# Patient Record
Sex: MALE | Race: WHITE | Employment: OTHER | URBAN - METROPOLITAN AREA
[De-identification: names, ages, dates, MRNs, and addresses within clinical notes are randomized per-mention and may not be internally consistent; named-entity substitution may affect disease eponyms.]

---

## 2017-07-05 ENCOUNTER — TELEPHONE (OUTPATIENT)
Dept: PAIN MANAGEMENT | Age: 47
End: 2017-07-05

## 2017-07-18 ENCOUNTER — TELEPHONE (OUTPATIENT)
Dept: PAIN MANAGEMENT | Age: 47
End: 2017-07-18

## 2021-04-11 ENCOUNTER — HOSPITAL ENCOUNTER (OUTPATIENT)
Age: 51
Setting detail: OBSERVATION
Discharge: HOME | End: 2021-04-12
Attending: EMERGENCY MEDICINE | Admitting: EMERGENCY MEDICINE
Payer: COMMERCIAL

## 2021-04-11 ENCOUNTER — APPOINTMENT (OUTPATIENT)
Dept: GENERAL RADIOLOGY | Age: 51
End: 2021-04-11
Payer: COMMERCIAL

## 2021-04-11 DIAGNOSIS — R00.1 CHRONIC SINUS BRADYCARDIA: ICD-10-CM

## 2021-04-11 DIAGNOSIS — R07.89 OTHER CHEST PAIN: Primary | ICD-10-CM

## 2021-04-11 DIAGNOSIS — R00.1 BRADYCARDIA: ICD-10-CM

## 2021-04-11 LAB
-: NORMAL
ABSOLUTE EOS #: 0.21 K/UL (ref 0–0.44)
ABSOLUTE IMMATURE GRANULOCYTE: 0.05 K/UL (ref 0–0.3)
ABSOLUTE LYMPH #: 3.09 K/UL (ref 1.1–3.7)
ABSOLUTE MONO #: 0.67 K/UL (ref 0.1–1.2)
ALBUMIN SERPL-MCNC: 4.3 G/DL (ref 3.5–5.2)
ALBUMIN/GLOBULIN RATIO: 1.3 (ref 1–2.5)
ALP BLD-CCNC: 93 U/L (ref 40–129)
ALT SERPL-CCNC: 53 U/L (ref 5–41)
AMORPHOUS: NORMAL
ANION GAP SERPL CALCULATED.3IONS-SCNC: 11 MMOL/L (ref 9–17)
AST SERPL-CCNC: 57 U/L
BACTERIA: NORMAL
BASOPHILS # BLD: 1 % (ref 0–2)
BASOPHILS ABSOLUTE: 0.08 K/UL (ref 0–0.2)
BILIRUB SERPL-MCNC: 0.22 MG/DL (ref 0.3–1.2)
BILIRUBIN URINE: NEGATIVE
BUN BLDV-MCNC: 8 MG/DL (ref 6–20)
BUN/CREAT BLD: ABNORMAL (ref 9–20)
CALCIUM SERPL-MCNC: 9.3 MG/DL (ref 8.6–10.4)
CASTS UA: NORMAL /LPF (ref 0–8)
CHLORIDE BLD-SCNC: 99 MMOL/L (ref 98–107)
CO2: 27 MMOL/L (ref 20–31)
COLOR: YELLOW
COMMENT UA: ABNORMAL
CREAT SERPL-MCNC: 1.08 MG/DL (ref 0.7–1.2)
CRYSTALS, UA: NORMAL /HPF
DIFFERENTIAL TYPE: ABNORMAL
EOSINOPHILS RELATIVE PERCENT: 2 % (ref 1–4)
EPITHELIAL CELLS UA: NORMAL /HPF (ref 0–5)
GFR AFRICAN AMERICAN: >60 ML/MIN
GFR NON-AFRICAN AMERICAN: >60 ML/MIN
GFR SERPL CREATININE-BSD FRML MDRD: ABNORMAL ML/MIN/{1.73_M2}
GFR SERPL CREATININE-BSD FRML MDRD: ABNORMAL ML/MIN/{1.73_M2}
GLUCOSE BLD-MCNC: 106 MG/DL (ref 70–99)
GLUCOSE URINE: NEGATIVE
HCT VFR BLD CALC: 38.3 % (ref 40.7–50.3)
HEMOGLOBIN: 12.7 G/DL (ref 13–17)
IMMATURE GRANULOCYTES: 1 %
KETONES, URINE: NEGATIVE
LEUKOCYTE ESTERASE, URINE: NEGATIVE
LIPASE: 13 U/L (ref 13–60)
LYMPHOCYTES # BLD: 29 % (ref 24–43)
MCH RBC QN AUTO: 30.3 PG (ref 25.2–33.5)
MCHC RBC AUTO-ENTMCNC: 33.2 G/DL (ref 28.4–34.8)
MCV RBC AUTO: 91.4 FL (ref 82.6–102.9)
MONOCYTES # BLD: 6 % (ref 3–12)
MUCUS: NORMAL
NITRITE, URINE: NEGATIVE
NRBC AUTOMATED: 0 PER 100 WBC
OTHER OBSERVATIONS UA: NORMAL
PDW BLD-RTO: 12.4 % (ref 11.8–14.4)
PH UA: 5 (ref 5–8)
PLATELET # BLD: 276 K/UL (ref 138–453)
PLATELET ESTIMATE: ABNORMAL
PMV BLD AUTO: 10.4 FL (ref 8.1–13.5)
POTASSIUM SERPL-SCNC: 3.6 MMOL/L (ref 3.7–5.3)
PROTEIN UA: NEGATIVE
RBC # BLD: 4.19 M/UL (ref 4.21–5.77)
RBC # BLD: ABNORMAL 10*6/UL
RBC UA: NORMAL /HPF (ref 0–4)
RENAL EPITHELIAL, UA: NORMAL /HPF
SARS-COV-2, RAPID: NOT DETECTED
SEG NEUTROPHILS: 61 % (ref 36–65)
SEGMENTED NEUTROPHILS ABSOLUTE COUNT: 6.64 K/UL (ref 1.5–8.1)
SODIUM BLD-SCNC: 137 MMOL/L (ref 135–144)
SPECIFIC GRAVITY UA: 1 (ref 1–1.03)
SPECIMEN DESCRIPTION: NORMAL
TOTAL PROTEIN: 7.7 G/DL (ref 6.4–8.3)
TRICHOMONAS: NORMAL
TROPONIN INTERP: NORMAL
TROPONIN T: NORMAL NG/ML
TROPONIN, HIGH SENSITIVITY: 11 NG/L (ref 0–22)
TURBIDITY: CLEAR
URINE HGB: ABNORMAL
UROBILINOGEN, URINE: NORMAL
WBC # BLD: 10.7 K/UL (ref 3.5–11.3)
WBC # BLD: ABNORMAL 10*3/UL
WBC UA: NORMAL /HPF (ref 0–5)
YEAST: NORMAL

## 2021-04-11 PROCEDURE — 80053 COMPREHEN METABOLIC PANEL: CPT

## 2021-04-11 PROCEDURE — 84100 ASSAY OF PHOSPHORUS: CPT

## 2021-04-11 PROCEDURE — 93005 ELECTROCARDIOGRAM TRACING: CPT | Performed by: EMERGENCY MEDICINE

## 2021-04-11 PROCEDURE — 81001 URINALYSIS AUTO W/SCOPE: CPT

## 2021-04-11 PROCEDURE — 85025 COMPLETE CBC W/AUTO DIFF WBC: CPT

## 2021-04-11 PROCEDURE — 84484 ASSAY OF TROPONIN QUANT: CPT

## 2021-04-11 PROCEDURE — G0378 HOSPITAL OBSERVATION PER HR: HCPCS

## 2021-04-11 PROCEDURE — 99285 EMERGENCY DEPT VISIT HI MDM: CPT

## 2021-04-11 PROCEDURE — 87635 SARS-COV-2 COVID-19 AMP PRB: CPT

## 2021-04-11 PROCEDURE — 83735 ASSAY OF MAGNESIUM: CPT

## 2021-04-11 PROCEDURE — 83690 ASSAY OF LIPASE: CPT

## 2021-04-11 PROCEDURE — 6370000000 HC RX 637 (ALT 250 FOR IP): Performed by: EMERGENCY MEDICINE

## 2021-04-11 PROCEDURE — 71046 X-RAY EXAM CHEST 2 VIEWS: CPT

## 2021-04-11 PROCEDURE — 36415 COLL VENOUS BLD VENIPUNCTURE: CPT

## 2021-04-11 RX ORDER — CLONIDINE HYDROCHLORIDE 0.2 MG/1
0.2 TABLET ORAL 2 TIMES DAILY
Status: ON HOLD | COMMUNITY
End: 2021-04-12 | Stop reason: HOSPADM

## 2021-04-11 RX ORDER — BENZONATATE 100 MG/1
100 CAPSULE ORAL 3 TIMES DAILY PRN
Status: ON HOLD | COMMUNITY
End: 2022-05-17

## 2021-04-11 RX ORDER — CLONIDINE 0.2 MG/24H
1 PATCH, EXTENDED RELEASE TRANSDERMAL WEEKLY
Status: ON HOLD | COMMUNITY
End: 2021-04-12 | Stop reason: HOSPADM

## 2021-04-11 RX ORDER — CLONIDINE HYDROCHLORIDE 0.1 MG/1
0.2 TABLET ORAL 2 TIMES DAILY
Status: DISCONTINUED | OUTPATIENT
Start: 2021-04-11 | End: 2021-04-12 | Stop reason: HOSPADM

## 2021-04-11 RX ORDER — METHADONE HYDROCHLORIDE 5 MG/5ML
135 SOLUTION ORAL DAILY
COMMUNITY
Start: 2021-04-12

## 2021-04-11 RX ORDER — SERTRALINE HYDROCHLORIDE 100 MG/1
200 TABLET, FILM COATED ORAL DAILY
Status: ON HOLD | COMMUNITY
End: 2021-04-12 | Stop reason: HOSPADM

## 2021-04-11 RX ORDER — ACETAMINOPHEN 325 MG/1
650 TABLET ORAL EVERY 4 HOURS PRN
Status: DISCONTINUED | OUTPATIENT
Start: 2021-04-11 | End: 2021-04-12 | Stop reason: HOSPADM

## 2021-04-11 RX ORDER — ATORVASTATIN CALCIUM 40 MG/1
40 TABLET, FILM COATED ORAL DAILY
Status: ON HOLD | COMMUNITY
End: 2021-04-12 | Stop reason: HOSPADM

## 2021-04-11 RX ORDER — SODIUM CHLORIDE 0.9 % (FLUSH) 0.9 %
5-40 SYRINGE (ML) INJECTION EVERY 12 HOURS SCHEDULED
Status: DISCONTINUED | OUTPATIENT
Start: 2021-04-11 | End: 2021-04-12 | Stop reason: HOSPADM

## 2021-04-11 RX ORDER — ACETAMINOPHEN 500 MG
1000 TABLET ORAL ONCE
Status: COMPLETED | OUTPATIENT
Start: 2021-04-11 | End: 2021-04-11

## 2021-04-11 RX ORDER — SODIUM CHLORIDE 0.9 % (FLUSH) 0.9 %
5-40 SYRINGE (ML) INJECTION PRN
Status: DISCONTINUED | OUTPATIENT
Start: 2021-04-11 | End: 2021-04-12 | Stop reason: HOSPADM

## 2021-04-11 RX ORDER — ATORVASTATIN CALCIUM 80 MG/1
40 TABLET, FILM COATED ORAL DAILY
Status: DISCONTINUED | OUTPATIENT
Start: 2021-04-12 | End: 2021-04-12 | Stop reason: HOSPADM

## 2021-04-11 RX ORDER — SODIUM CHLORIDE 9 MG/ML
25 INJECTION, SOLUTION INTRAVENOUS PRN
Status: DISCONTINUED | OUTPATIENT
Start: 2021-04-11 | End: 2021-04-12 | Stop reason: HOSPADM

## 2021-04-11 RX ORDER — BENZONATATE 100 MG/1
100 CAPSULE ORAL 3 TIMES DAILY PRN
Status: DISCONTINUED | OUTPATIENT
Start: 2021-04-11 | End: 2021-04-12 | Stop reason: HOSPADM

## 2021-04-11 RX ORDER — IBUPROFEN 800 MG/1
800 TABLET ORAL ONCE
Status: COMPLETED | OUTPATIENT
Start: 2021-04-11 | End: 2021-04-11

## 2021-04-11 RX ADMIN — ACETAMINOPHEN 1000 MG: 500 TABLET ORAL at 19:27

## 2021-04-11 RX ADMIN — IBUPROFEN 800 MG: 800 TABLET ORAL at 19:27

## 2021-04-11 RX ADMIN — ACETAMINOPHEN 650 MG: 325 TABLET ORAL at 23:58

## 2021-04-11 RX ADMIN — CLONIDINE HYDROCHLORIDE 0.2 MG: 0.1 TABLET ORAL at 22:44

## 2021-04-11 RX ADMIN — BENZONATATE 100 MG: 100 CAPSULE ORAL at 22:44

## 2021-04-11 ASSESSMENT — ENCOUNTER SYMPTOMS
NAUSEA: 0
SORE THROAT: 0
SHORTNESS OF BREATH: 1
VOMITING: 0
ABDOMINAL PAIN: 0
DIARRHEA: 0
CONSTIPATION: 0

## 2021-04-11 ASSESSMENT — PAIN DESCRIPTION - LOCATION: LOCATION: CHEST

## 2021-04-11 ASSESSMENT — PAIN SCALES - GENERAL: PAINLEVEL_OUTOF10: 8

## 2021-04-11 ASSESSMENT — PAIN DESCRIPTION - PAIN TYPE: TYPE: ACUTE PAIN

## 2021-04-11 NOTE — ED NOTES
Pt to ED cc left side CP and HTN ongiong for a couple of days that is associated with SOB. Pt states he does not have any cardiac hx. Pt states he noticed the pain is worse when he is moving around. Pt denies any N/V/D, Pt placed on continuous cardiac monitoring, BP, Pulse ox. EKG obtained. IV established and labs drawn. Pt alert and oriented x4, talking in complete sentences, respirations even and unlabored. Pt acting age appropriate.  White board updated, will continue to plan of care         Shefali Hodge RN  04/11/21 1951

## 2021-04-12 ENCOUNTER — APPOINTMENT (OUTPATIENT)
Dept: NUCLEAR MEDICINE | Age: 51
End: 2021-04-12
Payer: COMMERCIAL

## 2021-04-12 VITALS
HEART RATE: 52 BPM | DIASTOLIC BLOOD PRESSURE: 81 MMHG | SYSTOLIC BLOOD PRESSURE: 177 MMHG | OXYGEN SATURATION: 93 % | TEMPERATURE: 98.2 F | RESPIRATION RATE: 15 BRPM

## 2021-04-12 LAB
ABSOLUTE EOS #: 0.19 K/UL (ref 0–0.44)
ABSOLUTE IMMATURE GRANULOCYTE: 0.03 K/UL (ref 0–0.3)
ABSOLUTE LYMPH #: 2.95 K/UL (ref 1.1–3.7)
ABSOLUTE MONO #: 0.52 K/UL (ref 0.1–1.2)
ANION GAP SERPL CALCULATED.3IONS-SCNC: 11 MMOL/L (ref 9–17)
BASOPHILS # BLD: 1 % (ref 0–2)
BASOPHILS ABSOLUTE: 0.04 K/UL (ref 0–0.2)
BUN BLDV-MCNC: 10 MG/DL (ref 6–20)
BUN/CREAT BLD: ABNORMAL (ref 9–20)
CALCIUM SERPL-MCNC: 8.9 MG/DL (ref 8.6–10.4)
CHLORIDE BLD-SCNC: 102 MMOL/L (ref 98–107)
CO2: 27 MMOL/L (ref 20–31)
CREAT SERPL-MCNC: 1 MG/DL (ref 0.7–1.2)
DIFFERENTIAL TYPE: ABNORMAL
EOSINOPHILS RELATIVE PERCENT: 2 % (ref 1–4)
GFR AFRICAN AMERICAN: >60 ML/MIN
GFR NON-AFRICAN AMERICAN: >60 ML/MIN
GFR SERPL CREATININE-BSD FRML MDRD: ABNORMAL ML/MIN/{1.73_M2}
GFR SERPL CREATININE-BSD FRML MDRD: ABNORMAL ML/MIN/{1.73_M2}
GLUCOSE BLD-MCNC: 114 MG/DL (ref 70–99)
HCT VFR BLD CALC: 37.1 % (ref 40.7–50.3)
HEMOGLOBIN: 12.3 G/DL (ref 13–17)
IMMATURE GRANULOCYTES: 0 %
LV EF: 47 %
LV EF: 55 %
LVEF MODALITY: NORMAL
LVEF MODALITY: NORMAL
LYMPHOCYTES # BLD: 38 % (ref 24–43)
MAGNESIUM: 1.8 MG/DL (ref 1.6–2.6)
MCH RBC QN AUTO: 30.5 PG (ref 25.2–33.5)
MCHC RBC AUTO-ENTMCNC: 33.2 G/DL (ref 28.4–34.8)
MCV RBC AUTO: 92.1 FL (ref 82.6–102.9)
MONOCYTES # BLD: 7 % (ref 3–12)
NRBC AUTOMATED: 0 PER 100 WBC
PDW BLD-RTO: 12.4 % (ref 11.8–14.4)
PHOSPHORUS: 3.1 MG/DL (ref 2.5–4.5)
PLATELET # BLD: 244 K/UL (ref 138–453)
PLATELET ESTIMATE: ABNORMAL
PMV BLD AUTO: 10.3 FL (ref 8.1–13.5)
POTASSIUM SERPL-SCNC: 4 MMOL/L (ref 3.7–5.3)
RBC # BLD: 4.03 M/UL (ref 4.21–5.77)
RBC # BLD: ABNORMAL 10*6/UL
SEG NEUTROPHILS: 52 % (ref 36–65)
SEGMENTED NEUTROPHILS ABSOLUTE COUNT: 4.11 K/UL (ref 1.5–8.1)
SODIUM BLD-SCNC: 140 MMOL/L (ref 135–144)
TROPONIN INTERP: NORMAL
TROPONIN T: NORMAL NG/ML
TROPONIN, HIGH SENSITIVITY: 8 NG/L (ref 0–22)
TSH SERPL DL<=0.05 MIU/L-ACNC: 3.6 MIU/L (ref 0.3–5)
WBC # BLD: 7.8 K/UL (ref 3.5–11.3)
WBC # BLD: ABNORMAL 10*3/UL

## 2021-04-12 PROCEDURE — 93017 CV STRESS TEST TRACING ONLY: CPT

## 2021-04-12 PROCEDURE — 93005 ELECTROCARDIOGRAM TRACING: CPT | Performed by: EMERGENCY MEDICINE

## 2021-04-12 PROCEDURE — 85025 COMPLETE CBC W/AUTO DIFF WBC: CPT

## 2021-04-12 PROCEDURE — G0378 HOSPITAL OBSERVATION PER HR: HCPCS

## 2021-04-12 PROCEDURE — 93306 TTE W/DOPPLER COMPLETE: CPT

## 2021-04-12 PROCEDURE — 78452 HT MUSCLE IMAGE SPECT MULT: CPT

## 2021-04-12 PROCEDURE — 84484 ASSAY OF TROPONIN QUANT: CPT

## 2021-04-12 PROCEDURE — 3430000000 HC RX DIAGNOSTIC RADIOPHARMACEUTICAL: Performed by: INTERNAL MEDICINE

## 2021-04-12 PROCEDURE — 6360000002 HC RX W HCPCS: Performed by: STUDENT IN AN ORGANIZED HEALTH CARE EDUCATION/TRAINING PROGRAM

## 2021-04-12 PROCEDURE — 2580000003 HC RX 258: Performed by: INTERNAL MEDICINE

## 2021-04-12 PROCEDURE — 96374 THER/PROPH/DIAG INJ IV PUSH: CPT

## 2021-04-12 PROCEDURE — A9500 TC99M SESTAMIBI: HCPCS | Performed by: INTERNAL MEDICINE

## 2021-04-12 PROCEDURE — 6360000002 HC RX W HCPCS: Performed by: INTERNAL MEDICINE

## 2021-04-12 PROCEDURE — 6370000000 HC RX 637 (ALT 250 FOR IP): Performed by: STUDENT IN AN ORGANIZED HEALTH CARE EDUCATION/TRAINING PROGRAM

## 2021-04-12 PROCEDURE — 84443 ASSAY THYROID STIM HORMONE: CPT

## 2021-04-12 PROCEDURE — 2580000003 HC RX 258: Performed by: STUDENT IN AN ORGANIZED HEALTH CARE EDUCATION/TRAINING PROGRAM

## 2021-04-12 PROCEDURE — 80048 BASIC METABOLIC PNL TOTAL CA: CPT

## 2021-04-12 RX ORDER — SODIUM CHLORIDE 0.9 % (FLUSH) 0.9 %
10 SYRINGE (ML) INJECTION PRN
Status: DISCONTINUED | OUTPATIENT
Start: 2021-04-12 | End: 2021-04-12 | Stop reason: HOSPADM

## 2021-04-12 RX ORDER — AMINOPHYLLINE DIHYDRATE 25 MG/ML
50 INJECTION, SOLUTION INTRAVENOUS PRN
Status: DISCONTINUED | OUTPATIENT
Start: 2021-04-12 | End: 2021-04-12 | Stop reason: ALTCHOICE

## 2021-04-12 RX ORDER — METHADONE HYDROCHLORIDE 5 MG/5ML
135 SOLUTION ORAL DAILY
Status: DISCONTINUED | OUTPATIENT
Start: 2021-04-12 | End: 2021-04-12 | Stop reason: HOSPADM

## 2021-04-12 RX ORDER — METOPROLOL TARTRATE 5 MG/5ML
5 INJECTION INTRAVENOUS EVERY 5 MIN PRN
Status: DISCONTINUED | OUTPATIENT
Start: 2021-04-12 | End: 2021-04-12 | Stop reason: ALTCHOICE

## 2021-04-12 RX ORDER — SODIUM CHLORIDE 0.9 % (FLUSH) 0.9 %
5-40 SYRINGE (ML) INJECTION PRN
Status: DISCONTINUED | OUTPATIENT
Start: 2021-04-12 | End: 2021-04-12 | Stop reason: ALTCHOICE

## 2021-04-12 RX ORDER — NITROGLYCERIN 0.4 MG/1
0.4 TABLET SUBLINGUAL EVERY 5 MIN PRN
Status: DISCONTINUED | OUTPATIENT
Start: 2021-04-12 | End: 2021-04-12 | Stop reason: ALTCHOICE

## 2021-04-12 RX ORDER — ATROPINE SULFATE 0.1 MG/ML
0.5 INJECTION INTRAVENOUS EVERY 5 MIN PRN
Status: DISCONTINUED | OUTPATIENT
Start: 2021-04-12 | End: 2021-04-12 | Stop reason: ALTCHOICE

## 2021-04-12 RX ORDER — ALPRAZOLAM 0.25 MG/1
0.25 TABLET ORAL ONCE
Status: COMPLETED | OUTPATIENT
Start: 2021-04-12 | End: 2021-04-12

## 2021-04-12 RX ORDER — KETOROLAC TROMETHAMINE 15 MG/ML
15 INJECTION, SOLUTION INTRAMUSCULAR; INTRAVENOUS ONCE
Status: COMPLETED | OUTPATIENT
Start: 2021-04-12 | End: 2021-04-12

## 2021-04-12 RX ORDER — ALPRAZOLAM 0.25 MG/1
0.5 TABLET ORAL 3 TIMES DAILY PRN
Qty: 30 TABLET | Refills: 0 | Status: SHIPPED | OUTPATIENT
Start: 2021-04-12 | End: 2021-05-12

## 2021-04-12 RX ORDER — AMLODIPINE BESYLATE 5 MG/1
5 TABLET ORAL DAILY
Status: DISCONTINUED | OUTPATIENT
Start: 2021-04-12 | End: 2021-04-12 | Stop reason: HOSPADM

## 2021-04-12 RX ORDER — SODIUM CHLORIDE 9 MG/ML
INJECTION, SOLUTION INTRAVENOUS CONTINUOUS
Status: DISCONTINUED | OUTPATIENT
Start: 2021-04-12 | End: 2021-04-12 | Stop reason: HOSPADM

## 2021-04-12 RX ORDER — SODIUM CHLORIDE 9 MG/ML
500 INJECTION, SOLUTION INTRAVENOUS CONTINUOUS PRN
Status: DISCONTINUED | OUTPATIENT
Start: 2021-04-12 | End: 2021-04-12 | Stop reason: ALTCHOICE

## 2021-04-12 RX ADMIN — SODIUM CHLORIDE, PRESERVATIVE FREE 10 ML: 5 INJECTION INTRAVENOUS at 11:50

## 2021-04-12 RX ADMIN — KETOROLAC TROMETHAMINE 15 MG: 15 INJECTION, SOLUTION INTRAMUSCULAR; INTRAVENOUS at 08:59

## 2021-04-12 RX ADMIN — REGADENOSON 0.4 MG: 0.08 INJECTION, SOLUTION INTRAVENOUS at 11:50

## 2021-04-12 RX ADMIN — TETRAKIS(2-METHOXYISOBUTYLISOCYANIDE)COPPER(I) TETRAFLUOROBORATE 33 MILLICURIE: 1 INJECTION, POWDER, LYOPHILIZED, FOR SOLUTION INTRAVENOUS at 11:55

## 2021-04-12 RX ADMIN — TETRAKIS(2-METHOXYISOBUTYLISOCYANIDE)COPPER(I) TETRAFLUOROBORATE 12.6 MILLICURIE: 1 INJECTION, POWDER, LYOPHILIZED, FOR SOLUTION INTRAVENOUS at 10:08

## 2021-04-12 RX ADMIN — SODIUM CHLORIDE, PRESERVATIVE FREE 10 ML: 5 INJECTION INTRAVENOUS at 11:55

## 2021-04-12 RX ADMIN — ALPRAZOLAM 0.25 MG: 0.25 TABLET ORAL at 15:51

## 2021-04-12 RX ADMIN — SODIUM CHLORIDE, PRESERVATIVE FREE 10 ML: 5 INJECTION INTRAVENOUS at 11:49

## 2021-04-12 RX ADMIN — SODIUM CHLORIDE, PRESERVATIVE FREE 10 ML: 5 INJECTION INTRAVENOUS at 10:08

## 2021-04-12 RX ADMIN — SODIUM CHLORIDE: 9 INJECTION, SOLUTION INTRAVENOUS at 06:15

## 2021-04-12 RX ADMIN — METHADONE HYDROCHLORIDE 135 MG: 5 SOLUTION ORAL at 09:43

## 2021-04-12 ASSESSMENT — PAIN SCALES - GENERAL: PAINLEVEL_OUTOF10: 0

## 2021-04-12 NOTE — CONSULTS
Attestation signed by      Attending Physician Statement:    I have discussed the care of  Darrell Peck , including pertinent history and exam findings, with the Cardiology fellow/resident. I have seen and examined the patient and the key elements of all parts of the encounter have been performed by me. I agree with the assessment, plan and orders as documented by the fellow/resident, after I modified exam findings and plan of treatments, and the final version is my approved version of the assessment. Additional Comments: The patient was seen and examined, agree with below. Chest pain. Sinus bradycardia. Resolved and HR is 80-90. Wean off Clonidine. Plan for echo and stress test.              [facility]    Consult / H&P               Today's Date: 4/12/2021  Patient Name: Darrell Peck  Date of admission: 4/11/2021  6:48 PM  Patient's age: 45 y.o., 1970  Admission Dx: Bradycardia [R00.1]    Reason for Consult:  Cardiac evaluation    Requesting Physician: Yumiko Rivera MD    CHIEF COMPLAINT:  Chest pain and SOB     History Obtained From:  patient    HISTORY OF PRESENT ILLNESS:      58-year-old male with past medical history of bipolar disorder, hypertension presented with complaint of chest pain and shortness of breath. Has been having chest pain from last 2 days. Right side of chest without any radiation. EKG showed normal sinus rhythm. On presentation he is hypertensive with blood pressure in the 90s. He became bradycardic to 30s. Asymptomatic. Had stress test done in 2013 and it was normal.  Not on any AV ana blocking agents. TSH pending. Past Medical History:   has a past medical history of Bipolar 1 disorder ([address] and Herniated cervical disc. Past Surgical History:   has a past surgical history that includes Appendectomy and fracture surgery. Home Medications:    Prior to Admission medications    Medication Sig Start Date End Date Taking?  Authorizing Provider cloNIDine (CATAPRES) 0.2 MG/24HR PTWK Place 1 patch onto the skin once a week   Yes Historical Provider, MD   cloNIDine (CATAPRES) 0.2 MG tablet Take 0.2 mg by mouth 2 times daily   Yes Historical Provider, MD   atorvastatin (LIPITOR) 40 MG tablet Take 40 mg by mouth daily   Yes Historical Provider, MD   benzonatate (TESSALON) 100 MG capsule Take 100 mg by mouth 3 times daily as needed for Cough   Yes Historical Provider, MD   sertraline (ZOLOFT) 100 MG tablet Take 200 mg by mouth daily   Yes Historical Provider, MD   guanFACINE HCl (TENEX PO) Take 1 mg by mouth   Yes Historical Provider, MD   Ibuprofen (MOTRIN PO) Take by mouth   Yes Historical Provider, MD   methadone 5 MG/5ML solution Take 135 mg by mouth daily. Takes at 0700 am 4/12/21  Yes Historical Provider, MD   acetaminophen (TYLENOL) 325 MG tablet Take by mouth every 6 hours as needed for Pain    Historical Provider, MD   divalproex (DEPAKOTE) 500 MG DR tablet Take 500 mg by mouth 3 times daily     Historical Provider, MD      Current Facility-Administered Medications: 0.9 % sodium chloride infusion, , Intravenous, Continuous  sodium chloride flush 0.9 % injection 5-40 mL, 5-40 mL, Intravenous, 2 times per day  sodium chloride flush 0.9 % injection 5-40 mL, 5-40 mL, Intravenous, PRN  0.9 % sodium chloride infusion, 25 mL, Intravenous, PRN  enoxaparin (LOVENOX) injection 40 mg, 40 mg, Subcutaneous, Daily  acetaminophen (TYLENOL) tablet 650 mg, 650 mg, Oral, Q4H PRN  cloNIDine (CATAPRES) tablet 0.2 mg, 0.2 mg, Oral, BID  atorvastatin (LIPITOR) tablet 40 mg, 40 mg, Oral, Daily  benzonatate (TESSALON) capsule 100 mg, 100 mg, Oral, TID PRN  sertraline (ZOLOFT) tablet 200 mg, 200 mg, Oral, Daily    Allergies:  Codeine, Pcn [penicillins], and Sulfa antibiotics    Social History:   reports that he has been smoking cigarettes. He has been smoking about 0.50 packs per day. He has never used smokeless tobacco. He reports that he does not drink alcohol or use drugs. Family History: family history is not on file. No h/o sudden cardiac death. No for premature CAD    REVIEW OF SYSTEMS:    · Constitutional: there has been no unanticipated weight loss. There's been No change in energy level, No change in activity level. · Eyes: No visual changes or diplopia. No scleral icterus. · ENT: No Headaches  · Cardiovascular: As mentioned in H&P   · Respiratory: No previous pulmonary problems, No cough  · Gastrointestinal: No abdominal pain. No change in bowel or bladder habits. · Genitourinary: No dysuria, trouble voiding, or hematuria. · Musculoskeletal:  No gait disturbance, No weakness or joint complaints. · Integumentary: No rash or pruritis. · Neurological: No headache, diplopia, change in muscle strength, numbness or tingling. No change in gait, balance, coordination, mood, affect, memory, mentation, behavior. · s. PHYSICAL EXAM:      BP (!) 152/91   Pulse 51   Temp 98.6 °F (37 °C) (Oral)   Resp 15   SpO2 96%    Constitutional and General Appearance: alert, cooperative, no distress and appears stated age  HEENT: PERRL, no cervical lymphadenopathy. No masses palpable. Normal oral mucosa  Respiratory:  · Normal excursion and expansion without use of accessory muscles  · Resp Auscultation: Good respiratory effort. No for increased work of breathing. On auscultation: clear to auscultation bilaterally  Cardiovascular:  · The apical impulse is not displaced  · Heart tones are crisp and normal. regular S1 and S2.  · Jugular venous pulsation Normal  · The carotid upstroke is normal in amplitude and contour without delay or bruit  · Peripheral pulses are symmetrical and full   Abdomen:   · No masses or tenderness  · Bowel sounds present  Extremities:  ·  No Cyanosis or Clubbing  ·  Lower extremity edema: No  ·  Skin: Warm and dry  Neurological:  · Alert and oriented.   · Moves all extremities well  · No abnormalities of mood, affect, memory, mentation, or behavior are noted    DATA:    }. Labs:     CBC:   Recent Labs     04/11/21 1923 04/12/21  0600   WBC 10.7 7.8   HGB 12.7* 12.3*   HCT 38.3* 37.1*    244     BMP:   Recent Labs     04/11/21 1917      K 3.6*   CO2 27   BUN 8   CREATININE 1.08   LABGLOM >60   GLUCOSE 106*     BNP: No results for input(s): BNP in the last 72 hours. PT/INR: No results for input(s): PROTIME, INR in the last 72 hours. APTT:No results for input(s): APTT in the last 72 hours. CARDIAC ENZYMES:No results for input(s): CKTOTAL, CKMB, CKMBINDEX, TROPONINI in the last 72 hours. FASTING LIPID PANEL:No results found for: HDL, LDLDIRECT, LDLCALC, TRIG  LIVER PROFILE:  Recent Labs     04/11/21 1917   AST 57*   ALT 53*   LABALBU 4.3       IMPRESSION:    Patient Active Problem List   Diagnosis    Bradycardia     Assessment:         1  Chest pain  2. Dyspnea on exertion  3. Sinus bradycardia  4. Hypertensive urgency  5. Smoker   6. Last stress test ion 2013 - Negative       RECOMMENDATIONS:  1. EKG showed sinus bradycardia. No ST or T changes   2. TSH pending. Will order 2D echo to rule out any structural and functional abnormalities of the heart. 3. Trend troponin for 2 occurrences. 4. Avoid AV ana blocking agent. Will change clonidine to Norvasc   5.  will do Lexiscan stress test      Discussed with patient and Nurse.     Electronically signed by Kanchan Tran MD on 4/12/2021 at 6:37 [address] [place] [address] [facility]

## 2021-04-12 NOTE — CARE COORDINATION
Case Management Initial Discharge Plan  Elie Madrigal,             Met with:patient to discuss discharge plans. Information verified: address, contacts, phone number, , insurance Yes    Emergency Contact/Next of Kin name & number: Jessica Marin, spouse [phone]    PCP: Kala Baxter, RENETTA - CNP  Date of last visit: Patient sees Dr Perrin Hammans and was last seen 6 months ago     Insurance Provider: [facility]     Discharge Planning    Living Arrangements:      Support Systems:  Spouse/Significant Other    Home has 1 stories  0 stairs to climb to get into front door, na stairs to climb to reach second floor  Location of bedroom/bathroom in home main level     Patient able to perform ADL's:Independent    Current Services (outpatient & in home) none   DME equipment: none   DME provider: na     Receiving oral anticoagulation therapy? No    If indicated:   Physician managing anticoagulation treatment: na  Where does patient obtain lab work for ATC treatment? na      Potential Assistance Needed:  N/A    Patient agreeable to home care: No  [place] of choice provided:  n/a    Prior SNF/Rehab Placement and Facility: none   Agreeable to SNF/Rehab: No  [place] of choice provided: n/a     Evaluation: no    Expected Discharge date:       Patient expects to be discharged to:  Home  Follow Up Appointment: Best Day/ Time:      Transportation provider: family   Transportation arrangements needed for discharge: No    Readmission Risk              Risk of Unplanned Readmission:        0             Does patient have a readmission risk score greater than 14?: No  If yes, follow-up appointment must be made within 7 days of discharge. Goals of Care:  To feel like I'm secure       Discharge Plan: Home independently           Electronically signed by Abrahan Schultz RN on 21 at 2:22 PM EDT

## 2021-04-12 NOTE — ED PROVIDER NOTES
[facility] ED  Emergency Department Encounter  EmergencyMedicine Resident     Pt Name:Jose Barfield  MRN: [id number]  [place] 1970  Date of evaluation: 4/11/21  PCP:  RENETTA Andujar CNP    CHIEF COMPLAINT       Chief Complaint   Patient presents with    Chest Pain    Hypertension       HISTORY OF PRESENT ILLNESS  (Location/Symptom, Timing/Onset, Context/Setting, Quality, Duration, Modifying Factors, Severity.)      Bang Chan is a 45 y.o. male who presents to the emergency department with a 2-day history of chest pain and shortness of breath with a 2-week history of significant hypertension. Patient took his blood pressure at home and it was about 269 systolic so he came into the ED for evaluation. Chest pain is worsened with motion and anxiety. Patient endorses shortness of breath and denies recent fever, chills, nausea, vomiting, problems with urination or bowel movements, numbness or tingling anywhere, or abdominal pain. PAST MEDICAL / SURGICAL / SOCIAL / FAMILY HISTORY      has a past medical history of Bipolar 1 disorder ([address] and Herniated cervical disc.     has a past surgical history that includes Appendectomy and fracture surgery.     Social History     Socioeconomic History    Marital status:      Spouse name: Not on file    Number of children: Not on file    Years of education: Not on file    Highest education level: Not on file   Occupational History    Not on file   Social Needs    Financial resource strain: Not on file    Food insecurity     Worry: Not on file     Inability: Not on file    Transportation needs     Medical: Not on file     Non-medical: Not on file   Tobacco Use    Smoking status: Current Every Day Smoker     Packs/day: 0.50     Types: Cigarettes    Smokeless tobacco: Never Used   Substance and Sexual Activity    Alcohol use: No    Drug use: No    Sexual activity: Not on file   Lifestyle    Physical activity     Days per week: Not on file     Minutes per session: Not on file    Stress: Not on file   Relationships    Social connections     Talks on phone: Not on file     Gets together: Not on file     Attends Jew service: Not on file     Active member of club or organization: Not on file     Attends meetings of clubs or organizations: Not on file     Relationship status: Not on file    Intimate partner violence     Fear of current or ex partner: Not on file     Emotionally abused: Not on file     Physically abused: Not on file     Forced sexual activity: Not on file   Other Topics Concern    Not on file   Social History Narrative    Not on file       History reviewed. No pertinent family history. Allergies:  Codeine, Pcn [penicillins], and Sulfa antibiotics    Home Medications:  Prior to Admission medications    Medication Sig Start Date End Date Taking? Authorizing Provider   cloNIDine (CATAPRES) 0.2 MG/24HR PTWK Place 1 patch onto the skin once a week   Yes Historical Provider, MD   cloNIDine (CATAPRES) 0.2 MG tablet Take 0.2 mg by mouth 2 times daily   Yes Historical Provider, MD   atorvastatin (LIPITOR) 40 MG tablet Take 40 mg by mouth daily   Yes Historical Provider, MD   benzonatate (TESSALON) 100 MG capsule Take 100 mg by mouth 3 times daily as needed for Cough   Yes Historical Provider, MD   sertraline (ZOLOFT) 100 MG tablet Take 200 mg by mouth daily   Yes Historical Provider, MD   guanFACINE HCl (TENEX PO) Take 1 mg by mouth   Yes Historical Provider, MD   Ibuprofen (MOTRIN PO) Take by mouth   Yes Historical Provider, MD   acetaminophen (TYLENOL) 325 MG tablet Take by mouth every 6 hours as needed for Pain    Historical Provider, MD   divalproex (DEPAKOTE) 500 MG DR tablet Take 500 mg by mouth 3 times daily     Historical Provider, MD       REVIEW OF SYSTEMS    (2-9 systems for level 4, 10 or more for level 5)      Review of Systems   Constitutional: Negative for chills and fever.    HENT: Negative for ear pain, hearing loss and sore throat. Eyes: Negative for visual disturbance. Respiratory: Positive for shortness of breath. Cardiovascular: Positive for chest pain. Gastrointestinal: Negative for abdominal pain, constipation, diarrhea, nausea and vomiting. Genitourinary: Negative for difficulty urinating and dysuria. Musculoskeletal: Negative for arthralgias and myalgias. Neurological: Negative for weakness and numbness. Psychiatric/Behavioral: Negative for agitation and confusion. The patient is nervous/anxious. PHYSICAL EXAM   (up to 7 for level 4, 8 or more for level 5)      INITIAL VITALS:   /82   Pulse (!) 40   Temp 98.6 °F (37 °C) (Oral)   Resp 13   SpO2 95%     Physical Exam  Vitals signs and nursing note reviewed. Constitutional:       General: He is not in acute distress. Appearance: Normal appearance. He is well-developed. He is not ill-appearing or diaphoretic. HENT:      Head: Normocephalic and atraumatic. Right Ear: External ear normal.      Left Ear: External ear normal.      Nose: Nose normal.      Mouth/Throat:      Mouth: Mucous membranes are moist.   Eyes:      Extraocular Movements: Extraocular movements intact. Conjunctiva/sclera: Conjunctivae normal.   Neck:      Musculoskeletal: Normal range of motion and neck supple. Trachea: No tracheal deviation. Cardiovascular:      Rate and Rhythm: Regular rhythm. Bradycardia present. Heart sounds: Normal heart sounds. No murmur. No friction rub. No gallop. Pulmonary:      Effort: Pulmonary effort is normal. No respiratory distress. Breath sounds: Normal breath sounds. No wheezing, rhonchi or rales. Abdominal:      General: Abdomen is flat. There is no distension. Musculoskeletal: Normal range of motion. General: No swelling, deformity or signs of injury. Right lower leg: No edema. Left lower leg: No edema. Skin:     General: Skin is warm and dry.       Capillary Refill: Capillary refill takes less than 2 seconds. Coloration: Skin is not jaundiced. Findings: No bruising or lesion. Neurological:      General: No focal deficit present. Mental Status: He is alert and oriented to person, place, and time. Mental status is at baseline. Motor: No abnormal muscle tone. Psychiatric:      Comments: Patient has pressured speech         DIFFERENTIAL  DIAGNOSIS     PLAN (LABS / IMAGING / EKG):  Orders Placed This Encounter   Procedures    COVID-19, Rapid    XR CHEST (2 VW)    CBC Auto Differential    Comprehensive Metabolic Panel w/ Reflex to MG    Lipase    Troponin    Urinalysis Reflex to Culture    Microscopic Urinalysis    PATIENT STATUS (FROM ED OR OR/PROCEDURAL) Observation       MEDICATIONS ORDERED:  Orders Placed This Encounter   Medications    acetaminophen (TYLENOL) tablet 1,000 mg    ibuprofen (ADVIL;MOTRIN) tablet 800 mg       DDX: Zander Vazquez is a 45 y.o. male who presents to the emergency department with chest pain, hypertension, anxiety.  Differential diagnosis includes ACS, symptomatic bradycardia, cardiac arrhythmia, electrolyte derangement, anxiety    DIAGNOSTIC RESULTS / EMERGENCY DEPARTMENT COURSE / MDM   LAB RESULTS:  Results for orders placed or performed during the hospital encounter of 04/11/21   CBC Auto Differential   Result Value Ref Range    WBC 10.7 3.5 - 11.3 k/uL    RBC 4.19 (L) 4.21 - 5.77 m/uL    Hemoglobin 12.7 (L) 13.0 - 17.0 g/dL    Hematocrit 38.3 (L) 40.7 - 50.3 %    MCV 91.4 82.6 - 102.9 fL    MCH 30.3 25.2 - 33.5 pg    MCHC 33.2 28.4 - 34.8 g/dL    RDW 12.4 11.8 - 14.4 %    Platelets 163 071 - 301 k/uL    MPV 10.4 8.1 - 13.5 fL    NRBC Automated 0.0 0.0 per 100 WBC    Differential Type NOT REPORTED     Seg Neutrophils 61 36 - 65 %    Lymphocytes 29 24 - 43 %    Monocytes 6 3 - 12 %    Eosinophils % 2 1 - 4 %    Basophils 1 0 - 2 %    Immature Granulocytes 1 (H) 0 %    Segs Absolute 6.64 1.50 - 8.10 k/uL Absolute Lymph # 3.09 1.10 - 3.70 k/uL    Absolute Mono # 0.67 0.10 - 1.20 k/uL    Absolute Eos # 0.21 0.00 - 0.44 k/uL    Basophils Absolute 0.08 0.00 - 0.20 k/uL    Absolute Immature Granulocyte 0.05 0.00 - 0.30 k/uL    WBC Morphology NOT REPORTED     RBC Morphology NOT REPORTED     Platelet Estimate NOT REPORTED    Comprehensive Metabolic Panel w/ Reflex to MG   Result Value Ref Range    Glucose 106 (H) 70 - 99 mg/dL    BUN 8 6 - 20 mg/dL    CREATININE 1.08 0.70 - 1.20 mg/dL    Bun/Cre Ratio NOT REPORTED 9 - 20    Calcium 9.3 8.6 - 10.4 mg/dL    Sodium 137 135 - 144 mmol/L    Potassium 3.6 (L) 3.7 - 5.3 mmol/L    Chloride 99 98 - 107 mmol/L    CO2 27 20 - 31 mmol/L    Anion Gap 11 9 - 17 mmol/L    Alkaline Phosphatase 93 40 - 129 U/L    ALT 53 (H) 5 - 41 U/L    AST 57 (H) <40 U/L    Total Bilirubin 0.22 (L) 0.3 - 1.2 mg/dL    Total Protein 7.7 6.4 - 8.3 g/dL    Albumin 4.3 3.5 - 5.2 g/dL    Albumin/Globulin Ratio 1.3 1.0 - 2.5    GFR Non-African American >60 >60 mL/min    GFR African American >60 >60 mL/min    GFR Comment          GFR Staging NOT REPORTED    Lipase   Result Value Ref Range    Lipase 13 13 - 60 U/L   Troponin   Result Value Ref Range    Troponin, High Sensitivity 11 0 - 22 ng/L    Troponin T NOT REPORTED <0.03 ng/mL    Troponin Interp NOT REPORTED    Urinalysis Reflex to Culture    Specimen: Urine, clean catch   Result Value Ref Range    Color, UA YELLOW YELLOW    Turbidity UA CLEAR CLEAR    Glucose, Ur NEGATIVE NEGATIVE    Bilirubin Urine NEGATIVE NEGATIVE    Ketones, Urine NEGATIVE NEGATIVE    Specific Brady, UA 1.005 1.005 - 1.030    Urine Hgb TRACE (A) NEGATIVE    pH, UA 5.0 5.0 - 8.0    Protein, UA NEGATIVE NEGATIVE    Urobilinogen, Urine Normal Normal    Nitrite, Urine NEGATIVE NEGATIVE    Leukocyte Esterase, Urine NEGATIVE NEGATIVE    Urinalysis Comments NOT REPORTED    Microscopic Urinalysis   Result Value Ref Range    -          WBC, UA None 0 - 5 /HPF    RBC, UA 0 TO 2 0 - 4 /HPF Casts UA NOT REPORTED 0 - 8 /LPF    Crystals, UA NOT REPORTED None /HPF    Epithelial Cells UA None 0 - 5 /HPF    Renal Epithelial, UA NOT REPORTED 0 /HPF    Bacteria, UA NOT REPORTED None    Mucus, UA NOT REPORTED None    Trichomonas, UA NOT REPORTED None    Amorphous, UA NOT REPORTED None    Other Observations UA NOT REPORTED NOT REQ. Yeast, UA NOT REPORTED None       IMPRESSION: Cintia Carrasco is a 45 y.o. male who presents to the emergency department with chest pain, hypertension, and anxiety. On examination he is afebrile, vital signs demonstrate significant bradycardia with heart rate about 40 bpm and examination demonstrates a well-appearing male of stated age who is anxious with pressured speech. Examination is otherwise nonfocal.  Patient has had outpatient evaluation plan for his bradycardia but he has not yet gotten in to see a cardiologist.  We will plan for cardiopulmonary work-up, symptomatic treatment if needed. Disposition pending labs and imaging. RADIOLOGY:  Xr Chest (2 Vw)    Result Date: 4/11/2021  EXAMINATION: TWO XRAY VIEWS OF THE CHEST 4/11/2021 7:22 pm COMPARISON: 03/11/2013 HISTORY: ORDERING SYSTEM PROVIDED HISTORY: CP, SOA TECHNOLOGIST PROVIDED HISTORY: CP, SOA FINDINGS: The cardiomediastinal silhouette is normal in size and contour. The lungs are clear mildly hyperinflated. No pleural effusion or pneumothorax is present. No acute cardiopulmonary process       EKG  EKG Interpretation    Interpreted by emergency department physician    Rhythm: normal sinus   Rate: normal  Axis: normal  Ectopy: none  Conduction: QTC prolonged at 492 with no prior for comparison, of note U waves also appears to be present in V2 through V6  ST Segments: no acute change  T Waves: no acute change  Q Waves: none    Clinical Impression: Normal sinus rhythm with abnormal findings including , QTc 492, U waves    Wilder Yang MD    All EKG's are interpreted by the Emergency Department

## 2021-04-12 NOTE — PROCEDURES
[facility]                              CARDIAC STRESS TEST    PATIENT NAME: Naa Braun                     :        1970  MED REC NO:   [id number]                             ROOM:       [id number]  ACCOUNT NO:   [de-identified]                           ADMIT DATE: 2021  PROVIDER:     Carmelita Cortes    DATE OF STUDY:  2021    ORDERING PROVIDER:  La Kaplan MD  PRIMARY CARE PROVIDER:  Enma Villalba CNP  INTERPRETING PHYSICIAN:  Carmelita Cortes MD    LEXISCAN STRESS STUDY:  Indication:  chest pain  Procedure explained and consent signed    Medications:  Lexiscan, 0.4 mg  Resting heart rate:  50 bpm  Resting blood pressure:  132/85 mm/Hg  Infusion heart rate:  93 bpm  Infusion blood pressure:  150/74 mm/Hg  Resting EKG:  Abnormal (sinus bradycardia)  Stress heart response:  Normal  Stress BP response:  Appropriate  Chest discomfort:  None  Ischemic EKG changes:  None    IMPRESSION:  Electrocardiographically negative Lexiscan stress study. Radio-isotope  results to follow from the department of Nuclear Medicine.         Олег Thorne    D: 2021 14:30:05       T: 2021 14:32:14     AK/ARI  Job#: [id number]     Doc#: Unknown    CC:    ()

## 2021-04-12 NOTE — DISCHARGE SUMMARY
CDU Discharge Summary        Patient:  Oliva Javier  YOB: 1970    MRN: [id number]   Acct: [de-identified]    Primary Care Physician: RENETTA Karimi CNP    Admit date:  4/11/2021  6:48 PM  Discharge date: No discharge date for patient encounter. 4/12/2021    Discharge Diagnoses:     Acute bradycardia due to secondary to prescribed medication use  Improved with removal of the offending medication, time    Follow-up:  Call today/tomorrow for a follow up appointment with RENETTA Karimi CNP , or return to the Emergency Room with worsening symptoms    Stressed to patient the importance of following up with primary care doctor for further workup/management of symptoms. Pt verbalizes understanding and agrees with plan. Discharge Medications:  Changes to medications          Birda Bristol   Home Medication Instructions [id number]    Printed on:04/12/21 1526   Medication Information                      acetaminophen (TYLENOL) 325 MG tablet  Take by mouth every 6 hours as needed for Pain             ALPRAZolam (XANAX) 0.25 MG tablet  Take 2 tablets by mouth 3 times daily as needed for Anxiety for up to 30 days. benzonatate (TESSALON) 100 MG capsule  Take 100 mg by mouth 3 times daily as needed for Cough             guanFACINE HCl (TENEX PO)  Take 1 mg by mouth             Ibuprofen (MOTRIN PO)  Take by mouth             methadone 5 MG/5ML solution  Take 135 mg by mouth daily. Takes at 0700 am                 Diet:  DIET CARDIAC; , Advance as tolerated     Activity:  As tolerated    Consultants: IP CONSULT TO CARDIOLOGY    Procedures: Cardiac stress test    Diagnostic Test:   Results for orders placed or performed during the hospital encounter of 04/11/21   COVID-19, Rapid    Specimen: Nasopharyngeal Swab   Result Value Ref Range    Specimen Description . NASOPHARYNGEAL SWAB     SARS-CoV-2, Rapid Not Detected Not Detected   CBC Auto Differential   Result Value Ref Range WBC 10.7 3.5 - 11.3 k/uL    RBC 4.19 (L) 4.21 - 5.77 m/uL    Hemoglobin 12.7 (L) 13.0 - 17.0 g/dL    Hematocrit 38.3 (L) 40.7 - 50.3 %    MCV 91.4 82.6 - 102.9 fL    MCH 30.3 25.2 - 33.5 pg    MCHC 33.2 28.4 - 34.8 g/dL    RDW 12.4 11.8 - 14.4 %    Platelets 336 663 - 767 k/uL    MPV 10.4 8.1 - 13.5 fL    NRBC Automated 0.0 0.0 per 100 WBC    Differential Type NOT REPORTED     Seg Neutrophils 61 36 - 65 %    Lymphocytes 29 24 - 43 %    Monocytes 6 3 - 12 %    Eosinophils % 2 1 - 4 %    Basophils 1 0 - 2 %    Immature Granulocytes 1 (H) 0 %    Segs Absolute 6.64 1.50 - 8.10 k/uL    Absolute Lymph # 3.09 1.10 - 3.70 k/uL    Absolute Mono # 0.67 0.10 - 1.20 k/uL    Absolute Eos # 0.21 0.00 - 0.44 k/uL    Basophils Absolute 0.08 0.00 - 0.20 k/uL    Absolute Immature Granulocyte 0.05 0.00 - 0.30 k/uL    WBC Morphology NOT REPORTED     RBC Morphology NOT REPORTED     Platelet Estimate NOT REPORTED    Comprehensive Metabolic Panel w/ Reflex to MG   Result Value Ref Range    Glucose 106 (H) 70 - 99 mg/dL    BUN 8 6 - 20 mg/dL    CREATININE 1.08 0.70 - 1.20 mg/dL    Bun/Cre Ratio NOT REPORTED 9 - 20    Calcium 9.3 8.6 - 10.4 mg/dL    Sodium 137 135 - 144 mmol/L    Potassium 3.6 (L) 3.7 - 5.3 mmol/L    Chloride 99 98 - 107 mmol/L    CO2 27 20 - 31 mmol/L    Anion Gap 11 9 - 17 mmol/L    Alkaline Phosphatase 93 40 - 129 U/L    ALT 53 (H) 5 - 41 U/L    AST 57 (H) <40 U/L    Total Bilirubin 0.22 (L) 0.3 - 1.2 mg/dL    Total Protein 7.7 6.4 - 8.3 g/dL    Albumin 4.3 3.5 - 5.2 g/dL    Albumin/Globulin Ratio 1.3 1.0 - 2.5    GFR Non-African American >60 >60 mL/min    GFR African American >60 >60 mL/min    GFR Comment          GFR Staging NOT REPORTED    Lipase   Result Value Ref Range    Lipase 13 13 - 60 U/L   Troponin   Result Value Ref Range    Troponin, High Sensitivity 11 0 - 22 ng/L    Troponin T NOT REPORTED <0.03 ng/mL    Troponin Interp NOT REPORTED    Urinalysis Reflex to Culture    Specimen: Urine, clean catch   Result Value Ref Range    Glucose 114 (H) 70 - 99 mg/dL    BUN 10 6 - 20 mg/dL    CREATININE 1.00 0.70 - 1.20 mg/dL    Bun/Cre Ratio NOT REPORTED 9 - 20    Calcium 8.9 8.6 - 10.4 mg/dL    Sodium 140 135 - 144 mmol/L    Potassium 4.0 3.7 - 5.3 mmol/L    Chloride 102 98 - 107 mmol/L    CO2 27 20 - 31 mmol/L    Anion Gap 11 9 - 17 mmol/L    GFR Non-African American >60 >60 mL/min    GFR African American >60 >60 mL/min    GFR Comment          GFR Staging NOT REPORTED    Troponin   Result Value Ref Range    Troponin, High Sensitivity 8 0 - 22 ng/L    Troponin T NOT REPORTED <0.03 ng/mL    Troponin Interp NOT REPORTED    Magnesium   Result Value Ref Range    Magnesium 1.8 1.6 - 2.6 mg/dL   Phosphorus   Result Value Ref Range    Phosphorus 3.1 2.5 - 4.5 mg/dL   TSH with Reflex   Result Value Ref Range    TSH 3.60 0.30 - 5.00 mIU/L     Xr Chest (2 Vw)    Result Date: 4/11/2021  EXAMINATION: TWO XRAY VIEWS OF THE CHEST 4/11/2021 7:22 pm COMPARISON: 03/11/2013 HISTORY: ORDERING SYSTEM PROVIDED HISTORY: CP, SOA TECHNOLOGIST PROVIDED HISTORY: CP, SOA FINDINGS: The cardiomediastinal silhouette is normal in size and contour. The lungs are clear mildly hyperinflated. No pleural effusion or pneumothorax is present. No acute cardiopulmonary process           Physical Exam:    General appearance - NAD, AOx 3   Lungs -CTAB, no R/R/R  Heart - RRR, no M/R/G  Abdomen - Soft, NT/ND  Neurological:  MAEx4, No focal motor deficit, sensory loss  Extremities - Cap refil <2 sec in all ext., no edema  Skin -warm, dry      Hospital Course:  Clinical course has improved, labs and imaging reviewed. Vi Joyce originally presented to the hospital on 4/11/2021  6:48 PM. with symptomatic bradycardia. At that time it was determined that He required further observation and evaluation by cardiology and to have stress test completed. He was admitted and labs and imaging were followed daily. Imaging results as above.   Patient stress test returned as intermediate risk only for a low ejection fraction of 47%. However this was disputed by the echocardiogram which is much more sensitive testing that the ejection fraction was about 55%. Offending medication was stopped. Conversation with the patient about the importance of following up with both his primary care doctor and his psychiatrist for reevaluation of appropriate psychotic after medications as well as possible further need for evaluation of blood pressure was had. Patient expressed understanding and was able to reiterate back to the physician the shared decision-making plan. He is medically stable to be discharged. Disposition: Home    Patient stated that they will not drive themselves home from the hospital if they have gotten pain killers/ narcotics earlier that day and that they will arrange for transportation on their own or work with the  for a ride. Patient counseled NOT to drive while under the influence of narcotics/ pain killers. Condition: Good    Patient stable and ready for discharge home. I have discussed plan of care with patient and they are in understanding. They were instructed to read discharge paperwork. All of their questions and concerns were addressed. Time Spent: 0 day      --  Isha Leger MD  Emergency Medicine Resident Physician    This dictation was generated by voice recognition computer software. Although all attempts are made to edit the dictation for accuracy, there may be errors in the transcription that are not intended.

## 2021-04-12 NOTE — PROGRESS NOTES
[facility]  CDU / OBSERVATION ENCOUNTER  ATTENDING NOTE       I performed a history and physical examination of the patient and discussed management with the resident or midlevel provider. I reviewed the resident or midlevel provider's note and agree with the documented findings and plan of care. Any areas of disagreement are noted on the chart. I was personally present for the key portions of any procedures. I have documented in the chart those procedures where I was not present during the key portions. I have reviewed the nurses notes. I agree with the chief complaint, past medical history, past surgical history, allergies, medications, social and family history as documented unless otherwise noted below. The Family history, social history, and ROS are effectively unchanged since admission unless noted elsewhere in the chart. Patient presents with complaint of chest pain. Patient has 2-day history of shortness of breath and 2-week history of significant hypertension. Patient has also a significant bradycardia with heart rate into the 30s and 40s. Review of the patient's chart shows office visits and fairly close follow-up with primary care physician at [facility].  Patient's heart rate in in November 2020 was noted to be on an office visit in the 45s. Prior to that however the patient had normal heart rates at office visits. Patient bradycardic here. Review of chart does not show prior cardiology work-up. Stress and echo done. Stress test showed no perfusion abnormality. Patient's stress test showed intermediate probability due to low ejection fraction which was at 47% on Lexiscan but was normal on echocardiography. Patient acceptable for discharge based on lack of perfusion defect, normal echocardiogram, and resolution of bradycardia with discontinuation of clonidine.     On further history, it is discovered the patient is taken clonidine intermittently as a result concerns over pressure and anxiety. Initially prescribed the clonidine for complaints of anxiety by his psychiatrist.  Discussed with patient the use of as needed medications especially medications that have properties like clonidine. Patient for follow-up as outpatient.     Rajwinder oMss MD  Attending Emergency  Physician

## 2021-04-12 NOTE — H&P
[facility]  CDU / OBSERVATION eNCOUnter  Resident Note     Pt Name: Oliva Javier  MRN: [id number]  [place] 1970  Date of evaluation: 4/12/21  Patient's PCP is :  RENETTA Karimi CNP    CHIEF COMPLAINT       Chief Complaint   Patient presents with    Chest Pain    Hypertension         HISTORY OF PRESENT ILLNESS    Oliva Javier is a 45 y.o. male who presents with right-sided chest pain and mild shortness of breath sounds with bradycardia. Patient states that he has known hypertension past which was mildly well controlled on the patient's clonidine which was also stopped by a psychologist because it was giving him bradycardia. Patient was presented to the emergency department was noted high 30s low 40s for heart rate but currently hypertensive under the 160s. Patient states that he otherwise feels all right with no current complaints. Location/Symptom: Bradycardia  Timing/Onset: Intermittently, for months  Provocation: No provocation  Quality: Quality  Radiation: Nonradiating problem  Severity: Low severity  Timing/Duration: Ongoing  Modifying Factors: Medication    REVIEW OF SYSTEMS     General ROS - No fevers, No malaise   Ophthalmic ROS - No discharge, No changes in vision  ENT ROS -  No sore throat, No rhinorrhea,   Respiratory ROS - no shortness of breath, no cough, no  wheezing  Cardiovascular ROS - No chest pain, no dyspnea on exertion  Gastrointestinal ROS - No abdominal pain, no nausea or vomiting, no change in bowel habits, no black or bloody stools  Genito-Urinary ROS - No dysuria, trouble voiding, or hematuria  Musculoskeletal ROS - No myalgias, No arthalgias  Neurological ROS - No headache, no dizziness/lightheadedness, No focal weakness, no loss of sensation  Dermatological ROS - No lesions, No rash     (PQRS) Advance directives on face sheet per hospital policy.  No change unless specifically mentioned in chart    PAST MEDICAL HISTORY    has a past medical history of Bipolar 1 disorder ([facility] [address] and Herniated cervical disc. I have reviewed the past medical history with the patient and it is pertinent to this complaint. SURGICAL HISTORY      has a past surgical history that includes Appendectomy and fracture surgery. I have reviewed and agree with Surgical History entered and it is pertinent to this complaint. CURRENT MEDICATIONS     0.9 % sodium chloride infusion, Continuous  ketorolac (TORADOL) injection 15 mg, Once  sodium chloride flush 0.9 % injection 5-40 mL, 2 times per day  sodium chloride flush 0.9 % injection 5-40 mL, PRN  0.9 % sodium chloride infusion, PRN  enoxaparin (LOVENOX) injection 40 mg, Daily  acetaminophen (TYLENOL) tablet 650 mg, Q4H PRN  cloNIDine (CATAPRES) tablet 0.2 mg, BID  atorvastatin (LIPITOR) tablet 40 mg, Daily  benzonatate (TESSALON) capsule 100 mg, TID PRN  sertraline (ZOLOFT) tablet 200 mg, Daily        All medication charted and reviewed. ALLERGIES     is allergic to codeine; pcn [penicillins]; and sulfa antibiotics. FAMILY HISTORY     has no family status information on file. family history is not on file. The patient denies any pertinent family history. I have reviewed and agree with the family history entered. I have reviewed the Family History and it is not significant to the case    SOCIAL HISTORY      reports that he has been smoking cigarettes. He has been smoking about 0.50 packs per day. He has never used smokeless tobacco. He reports that he does not drink alcohol or use drugs. I have reviewed and agree with all Social.  There are no concerns for substance abuse/use. PHYSICAL EXAM     INITIAL VITALS:  oral temperature is 98.2 °F (36.8 °C). His blood pressure is 177/81 (abnormal) and his pulse is 52. His respiration is 15 and oxygen saturation is 93%.       CONSTITUTIONAL: AOx4, no apparent distress, appears stated age    HEAD: normocephalic, atraumatic   EYES: PERRLA, EOMI    ENT: moist mucous membranes, uvula midline   NECK: supple, symmetric   BACK: symmetric   LUNGS: clear to auscultation bilaterally   CARDIOVASCULAR: regular rate and rhythm, no murmurs, rubs or gallops   ABDOMEN: soft, non-tender, non-distended with normal active bowel sounds   NEUROLOGIC:  MAEx4, no focal sensory or motor deficits   MUSCULOSKELETAL: no clubbing, cyanosis or edema   SKIN: no rash or wounds       DIFFERENTIAL DIAGNOSIS/MDM:   First-degree heart block, that is etiology, normal variant    Patient states this is been a known problem for him for years. That he has intermittent bradycardic spells that can drop down to the high 30s low 40s. Patient has not been symptomatic but states that he recently been feeling unwell during the spells. Given the nature of his bradycardia. Cardiology will be consulted. Follow-up with recommendations from specialty service. DIAGNOSTIC RESULTS       RADIOLOGY:   I directly visualized the following  images and reviewed the radiologist interpretations:    Xr Chest (2 Vw)    Result Date: 4/11/2021  EXAMINATION: TWO XRAY VIEWS OF THE CHEST 4/11/2021 7:22 pm COMPARISON: 03/11/2013 HISTORY: ORDERING SYSTEM PROVIDED HISTORY: DIAMANTE COELHO TECHNOLOGIST PROVIDED HISTORY: MELITON, SOA FINDINGS: The cardiomediastinal silhouette is normal in size and contour. The lungs are clear mildly hyperinflated. No pleural effusion or pneumothorax is present. No acute cardiopulmonary process       LABS:  I have reviewed and interpreted all available lab results.   Labs Reviewed   CBC WITH AUTO DIFFERENTIAL - Abnormal; Notable for the following components:       Result Value    RBC 4.19 (*)     Hemoglobin 12.7 (*)     Hematocrit 38.3 (*)     Immature Granulocytes 1 (*)     All other components within normal limits   COMPREHENSIVE METABOLIC PANEL W/ REFLEX TO MG FOR LOW K - Abnormal; Notable for the following components:    Glucose 106 (*)     Potassium 3.6 (*)     ALT 53 (*)     AST 57 (*)     Total Bilirubin 0.22 (*)     All other components within normal limits   URINE RT REFLEX TO CULTURE - Abnormal; Notable for the following components:    Urine Hgb TRACE (*)     All other components within normal limits   CBC WITH AUTO DIFFERENTIAL - Abnormal; Notable for the following components:    RBC 4.03 (*)     Hemoglobin 12.3 (*)     Hematocrit 37.1 (*)     All other components within normal limits   BASIC METABOLIC PANEL - Abnormal; Notable for the following components:    Glucose 114 (*)     All other components within normal limits   COVID-19, RAPID   LIPASE   TROPONIN   MICROSCOPIC URINALYSIS   TROPONIN   MAGNESIUM   PHOSPHORUS   TSH WITH REFLEX         CDU IMPRESSION / Grzegorzmaurice Guzman is a 45 y.o. male who presents with chest pain and shortness of breath for 2 days. Patient states there is right-sided radiation. Patient was also found to have bradycardia in the high 30s. Cardiology will be consulted for multiple cardiac etiologies. With recommendations of specialty service. · Cardiology consultation  · Follow-up labs, scans and imaging  · Continue home medications and pain control  · Monitor vitals, labs, and imaging  · DISPO: pending consults and clinical improvement    CONSULTS:    IP CONSULT TO CARDIOLOGY    PROCEDURES:  Not indicated       PATIENT REFERRED TO:    Yesica Juárez, APRN - CNP  [address] [facility]  [phone]    Schedule an appointment as soon as possible for a visit in 1 week  For followup    [facility]  [address] [place] [address]  [phone]  Go to   As needed, If symptoms worsen      --  Najma Rodriguez MD   Emergency Medicine Resident     This dictation was generated by voice recognition computer software. Although all attempts are made to edit the dictation for accuracy, there may be errors in the transcription that are not intended.

## 2021-04-12 NOTE — ED NOTES
The following labs were labeled with patient stickers & tubed to lab;    [x]Lavender   []On Ice  []Blue  [x]Green/ Yellow  []Green/ Black []On Ice  []Pink  []Red  []Yellow    []COVID-19 Swab []Rapid    []Urine Sample  []Pelvic Cultures    []Blood Cultures     Alethea Sahu RN  04/12/21 2732

## 2021-04-13 LAB
EKG ATRIAL RATE: 44 BPM
EKG ATRIAL RATE: 91 BPM
EKG P AXIS: 68 DEGREES
EKG P AXIS: 73 DEGREES
EKG P-R INTERVAL: 138 MS
EKG P-R INTERVAL: 152 MS
EKG Q-T INTERVAL: 400 MS
EKG Q-T INTERVAL: 534 MS
EKG QRS DURATION: 102 MS
EKG QRS DURATION: 94 MS
EKG QTC CALCULATION (BAZETT): 456 MS
EKG QTC CALCULATION (BAZETT): 492 MS
EKG R AXIS: 41 DEGREES
EKG R AXIS: 61 DEGREES
EKG T AXIS: 47 DEGREES
EKG T AXIS: 61 DEGREES
EKG VENTRICULAR RATE: 44 BPM
EKG VENTRICULAR RATE: 91 BPM

## 2021-04-27 ENCOUNTER — HOSPITAL ENCOUNTER (OUTPATIENT)
Age: 51
Setting detail: OBSERVATION
Discharge: HOME | End: 2021-04-28
Attending: EMERGENCY MEDICINE | Admitting: EMERGENCY MEDICINE
Payer: COMMERCIAL

## 2021-04-27 DIAGNOSIS — R30.0 DYSURIA: ICD-10-CM

## 2021-04-27 DIAGNOSIS — R07.89 OTHER CHEST PAIN: ICD-10-CM

## 2021-04-27 DIAGNOSIS — I49.8 SINUS ARRHYTHMIA: Primary | ICD-10-CM

## 2021-04-27 PROBLEM — R07.9 CHEST PAIN: Status: ACTIVE | Noted: 2021-04-27

## 2021-04-27 LAB
ABSOLUTE EOS #: 0.17 K/UL (ref 0–0.44)
ABSOLUTE IMMATURE GRANULOCYTE: 0.06 K/UL (ref 0–0.3)
ABSOLUTE LYMPH #: 2.4 K/UL (ref 1.1–3.7)
ABSOLUTE MONO #: 0.4 K/UL (ref 0.1–1.2)
ALBUMIN SERPL-MCNC: 4.1 G/DL (ref 3.5–5.2)
ALBUMIN/GLOBULIN RATIO: 1.2 (ref 1–2.5)
ALP BLD-CCNC: 94 U/L (ref 40–129)
ALT SERPL-CCNC: 48 U/L (ref 5–41)
ANION GAP SERPL CALCULATED.3IONS-SCNC: 8 MMOL/L (ref 9–17)
AST SERPL-CCNC: 51 U/L
BASOPHILS # BLD: 1 % (ref 0–2)
BASOPHILS ABSOLUTE: 0.05 K/UL (ref 0–0.2)
BILIRUB SERPL-MCNC: 0.24 MG/DL (ref 0.3–1.2)
BILIRUBIN URINE: NEGATIVE
BUN BLDV-MCNC: 6 MG/DL (ref 6–20)
BUN/CREAT BLD: ABNORMAL (ref 9–20)
CALCIUM SERPL-MCNC: 9.6 MG/DL (ref 8.6–10.4)
CHLORIDE BLD-SCNC: 102 MMOL/L (ref 98–107)
CO2: 27 MMOL/L (ref 20–31)
COLOR: YELLOW
COMMENT UA: ABNORMAL
CREAT SERPL-MCNC: 0.71 MG/DL (ref 0.7–1.2)
DIFFERENTIAL TYPE: ABNORMAL
EOSINOPHILS RELATIVE PERCENT: 2 % (ref 1–4)
GFR AFRICAN AMERICAN: >60 ML/MIN
GFR NON-AFRICAN AMERICAN: >60 ML/MIN
GFR SERPL CREATININE-BSD FRML MDRD: ABNORMAL ML/MIN/{1.73_M2}
GFR SERPL CREATININE-BSD FRML MDRD: ABNORMAL ML/MIN/{1.73_M2}
GLUCOSE BLD-MCNC: 116 MG/DL (ref 70–99)
GLUCOSE URINE: NEGATIVE
HCT VFR BLD CALC: 43 % (ref 40.7–50.3)
HEMOGLOBIN: 14.3 G/DL (ref 13–17)
IMMATURE GRANULOCYTES: 1 %
KETONES, URINE: NEGATIVE
LEUKOCYTE ESTERASE, URINE: NEGATIVE
LIPASE: 19 U/L (ref 13–60)
LYMPHOCYTES # BLD: 31 % (ref 24–43)
MCH RBC QN AUTO: 30.6 PG (ref 25.2–33.5)
MCHC RBC AUTO-ENTMCNC: 33.3 G/DL (ref 28.4–34.8)
MCV RBC AUTO: 91.9 FL (ref 82.6–102.9)
MONOCYTES # BLD: 5 % (ref 3–12)
NITRITE, URINE: NEGATIVE
NRBC AUTOMATED: 0 PER 100 WBC
PDW BLD-RTO: 12.2 % (ref 11.8–14.4)
PH UA: 8.5 (ref 5–8)
PLATELET # BLD: 307 K/UL (ref 138–453)
PLATELET ESTIMATE: ABNORMAL
PMV BLD AUTO: 10.3 FL (ref 8.1–13.5)
POTASSIUM SERPL-SCNC: 4.3 MMOL/L (ref 3.7–5.3)
PROTEIN UA: NEGATIVE
RBC # BLD: 4.68 M/UL (ref 4.21–5.77)
RBC # BLD: ABNORMAL 10*6/UL
SARS-COV-2, RAPID: NOT DETECTED
SEG NEUTROPHILS: 60 % (ref 36–65)
SEGMENTED NEUTROPHILS ABSOLUTE COUNT: 4.67 K/UL (ref 1.5–8.1)
SODIUM BLD-SCNC: 137 MMOL/L (ref 135–144)
SPECIFIC GRAVITY UA: 1.02 (ref 1–1.03)
SPECIMEN DESCRIPTION: NORMAL
TOTAL PROTEIN: 7.5 G/DL (ref 6.4–8.3)
TROPONIN INTERP: ABNORMAL
TROPONIN INTERP: NORMAL
TROPONIN T: ABNORMAL NG/ML
TROPONIN T: NORMAL NG/ML
TROPONIN, HIGH SENSITIVITY: 26 NG/L (ref 0–22)
TROPONIN, HIGH SENSITIVITY: 7 NG/L (ref 0–22)
TROPONIN, HIGH SENSITIVITY: 8 NG/L (ref 0–22)
TROPONIN, HIGH SENSITIVITY: <6 NG/L (ref 0–22)
TURBIDITY: CLEAR
URINE HGB: NEGATIVE
UROBILINOGEN, URINE: NORMAL
WBC # BLD: 7.8 K/UL (ref 3.5–11.3)
WBC # BLD: ABNORMAL 10*3/UL

## 2021-04-27 PROCEDURE — 80053 COMPREHEN METABOLIC PANEL: CPT

## 2021-04-27 PROCEDURE — 6370000000 HC RX 637 (ALT 250 FOR IP): Performed by: STUDENT IN AN ORGANIZED HEALTH CARE EDUCATION/TRAINING PROGRAM

## 2021-04-27 PROCEDURE — 6370000000 HC RX 637 (ALT 250 FOR IP): Performed by: NURSE PRACTITIONER

## 2021-04-27 PROCEDURE — 84484 ASSAY OF TROPONIN QUANT: CPT

## 2021-04-27 PROCEDURE — 99283 EMERGENCY DEPT VISIT LOW MDM: CPT

## 2021-04-27 PROCEDURE — 93005 ELECTROCARDIOGRAM TRACING: CPT | Performed by: EMERGENCY MEDICINE

## 2021-04-27 PROCEDURE — 81003 URINALYSIS AUTO W/O SCOPE: CPT

## 2021-04-27 PROCEDURE — 96365 THER/PROPH/DIAG IV INF INIT: CPT

## 2021-04-27 PROCEDURE — 6370000000 HC RX 637 (ALT 250 FOR IP): Performed by: EMERGENCY MEDICINE

## 2021-04-27 PROCEDURE — 87661 TRICHOMONAS VAGINALIS AMPLIF: CPT

## 2021-04-27 PROCEDURE — 2580000003 HC RX 258: Performed by: EMERGENCY MEDICINE

## 2021-04-27 PROCEDURE — 96372 THER/PROPH/DIAG INJ SC/IM: CPT

## 2021-04-27 PROCEDURE — G0378 HOSPITAL OBSERVATION PER HR: HCPCS

## 2021-04-27 PROCEDURE — 6360000002 HC RX W HCPCS: Performed by: EMERGENCY MEDICINE

## 2021-04-27 PROCEDURE — 87635 SARS-COV-2 COVID-19 AMP PRB: CPT

## 2021-04-27 PROCEDURE — 85025 COMPLETE CBC W/AUTO DIFF WBC: CPT

## 2021-04-27 PROCEDURE — 96366 THER/PROPH/DIAG IV INF ADDON: CPT

## 2021-04-27 PROCEDURE — 83690 ASSAY OF LIPASE: CPT

## 2021-04-27 RX ORDER — TIZANIDINE 2 MG/1
2 TABLET ORAL EVERY 6 HOURS PRN
Status: DISCONTINUED | OUTPATIENT
Start: 2021-04-27 | End: 2021-04-28 | Stop reason: HOSPADM

## 2021-04-27 RX ORDER — ALPRAZOLAM 0.25 MG/1
0.5 TABLET ORAL 3 TIMES DAILY PRN
Status: DISCONTINUED | OUTPATIENT
Start: 2021-04-27 | End: 2021-04-28 | Stop reason: HOSPADM

## 2021-04-27 RX ORDER — LORAZEPAM 0.5 MG/1
1 TABLET ORAL ONCE
Status: COMPLETED | OUTPATIENT
Start: 2021-04-27 | End: 2021-04-27

## 2021-04-27 RX ORDER — SODIUM CHLORIDE 0.9 % (FLUSH) 0.9 %
5-40 SYRINGE (ML) INJECTION EVERY 12 HOURS SCHEDULED
Status: DISCONTINUED | OUTPATIENT
Start: 2021-04-27 | End: 2021-04-28 | Stop reason: HOSPADM

## 2021-04-27 RX ORDER — METHADONE HYDROCHLORIDE 5 MG/5ML
135 SOLUTION ORAL DAILY
Status: DISCONTINUED | OUTPATIENT
Start: 2021-04-27 | End: 2021-04-28 | Stop reason: HOSPADM

## 2021-04-27 RX ORDER — GABAPENTIN 100 MG/1
100 CAPSULE ORAL 3 TIMES DAILY
Status: DISCONTINUED | OUTPATIENT
Start: 2021-04-27 | End: 2021-04-28 | Stop reason: HOSPADM

## 2021-04-27 RX ORDER — NITROGLYCERIN 0.4 MG/1
0.4 TABLET SUBLINGUAL ONCE
Status: COMPLETED | OUTPATIENT
Start: 2021-04-27 | End: 2021-04-27

## 2021-04-27 RX ORDER — MAGNESIUM SULFATE IN WATER 40 MG/ML
2000 INJECTION, SOLUTION INTRAVENOUS ONCE
Status: COMPLETED | OUTPATIENT
Start: 2021-04-27 | End: 2021-04-27

## 2021-04-27 RX ORDER — ATORVASTATIN CALCIUM 80 MG/1
40 TABLET, FILM COATED ORAL NIGHTLY
Status: DISCONTINUED | OUTPATIENT
Start: 2021-04-27 | End: 2021-04-28 | Stop reason: HOSPADM

## 2021-04-27 RX ORDER — LOSARTAN POTASSIUM 50 MG/1
50 TABLET ORAL DAILY
Status: DISCONTINUED | OUTPATIENT
Start: 2021-04-27 | End: 2021-04-28 | Stop reason: HOSPADM

## 2021-04-27 RX ORDER — METHADONE HYDROCHLORIDE 5 MG/5ML
135 SOLUTION ORAL DAILY
Status: DISCONTINUED | OUTPATIENT
Start: 2021-04-28 | End: 2021-04-27

## 2021-04-27 RX ORDER — LOSARTAN POTASSIUM 50 MG/1
50 TABLET ORAL DAILY
COMMUNITY
Start: 2021-04-22 | End: 2022-05-17

## 2021-04-27 RX ORDER — ASPIRIN 81 MG/1
81 TABLET, CHEWABLE ORAL DAILY
Status: DISCONTINUED | OUTPATIENT
Start: 2021-04-27 | End: 2021-04-28 | Stop reason: HOSPADM

## 2021-04-27 RX ORDER — GABAPENTIN 100 MG/1
100 CAPSULE ORAL 3 TIMES DAILY
COMMUNITY

## 2021-04-27 RX ORDER — ASPIRIN 81 MG/1
324 TABLET, CHEWABLE ORAL ONCE
Status: COMPLETED | OUTPATIENT
Start: 2021-04-27 | End: 2021-04-27

## 2021-04-27 RX ORDER — BENZONATATE 100 MG/1
100 CAPSULE ORAL 3 TIMES DAILY PRN
Status: DISCONTINUED | OUTPATIENT
Start: 2021-04-27 | End: 2021-04-28 | Stop reason: HOSPADM

## 2021-04-27 RX ORDER — SODIUM CHLORIDE 0.9 % (FLUSH) 0.9 %
5-40 SYRINGE (ML) INJECTION PRN
Status: DISCONTINUED | OUTPATIENT
Start: 2021-04-27 | End: 2021-04-28 | Stop reason: HOSPADM

## 2021-04-27 RX ORDER — IBUPROFEN 800 MG/1
800 TABLET ORAL EVERY 6 HOURS PRN
Status: DISCONTINUED | OUTPATIENT
Start: 2021-04-27 | End: 2021-04-28 | Stop reason: HOSPADM

## 2021-04-27 RX ORDER — SODIUM CHLORIDE 9 MG/ML
25 INJECTION, SOLUTION INTRAVENOUS PRN
Status: DISCONTINUED | OUTPATIENT
Start: 2021-04-27 | End: 2021-04-28 | Stop reason: HOSPADM

## 2021-04-27 RX ORDER — ACETAMINOPHEN 325 MG/1
650 TABLET ORAL EVERY 4 HOURS PRN
Status: DISCONTINUED | OUTPATIENT
Start: 2021-04-27 | End: 2021-04-28 | Stop reason: HOSPADM

## 2021-04-27 RX ADMIN — LOSARTAN POTASSIUM 50 MG: 50 TABLET, FILM COATED ORAL at 13:18

## 2021-04-27 RX ADMIN — SODIUM CHLORIDE, PRESERVATIVE FREE 10 ML: 5 INJECTION INTRAVENOUS at 22:59

## 2021-04-27 RX ADMIN — NITROGLYCERIN 0.4 MG: 0.4 TABLET SUBLINGUAL at 09:22

## 2021-04-27 RX ADMIN — IBUPROFEN 800 MG: 800 TABLET, FILM COATED ORAL at 16:49

## 2021-04-27 RX ADMIN — MAGNESIUM SULFATE HEPTAHYDRATE 2000 MG: 40 INJECTION, SOLUTION INTRAVENOUS at 10:08

## 2021-04-27 RX ADMIN — METHADONE HYDROCHLORIDE 135 MG: 5 SOLUTION ORAL at 23:59

## 2021-04-27 RX ADMIN — ATORVASTATIN CALCIUM 40 MG: 80 TABLET, FILM COATED ORAL at 22:59

## 2021-04-27 RX ADMIN — ENOXAPARIN SODIUM 40 MG: 40 INJECTION, SOLUTION INTRAVENOUS; SUBCUTANEOUS at 16:49

## 2021-04-27 RX ADMIN — LORAZEPAM 1 MG: 0.5 TABLET ORAL at 09:22

## 2021-04-27 RX ADMIN — ASPIRIN 324 MG: 81 TABLET, CHEWABLE ORAL at 09:22

## 2021-04-27 RX ADMIN — GABAPENTIN 100 MG: 100 CAPSULE ORAL at 22:59

## 2021-04-27 RX ADMIN — ACETAMINOPHEN 650 MG: 325 TABLET ORAL at 16:49

## 2021-04-27 RX ADMIN — ALPRAZOLAM 0.5 MG: 0.25 TABLET ORAL at 16:49

## 2021-04-27 RX ADMIN — ASPIRIN 81 MG: 81 TABLET, CHEWABLE ORAL at 16:49

## 2021-04-27 RX ADMIN — TIZANIDINE 2 MG: 2 TABLET ORAL at 22:59

## 2021-04-27 ASSESSMENT — ENCOUNTER SYMPTOMS
TROUBLE SWALLOWING: 0
NAUSEA: 0
ABDOMINAL PAIN: 0
SHORTNESS OF BREATH: 0
DIARRHEA: 0
CONSTIPATION: 0
WHEEZING: 0
VOMITING: 0
SORE THROAT: 0
SHORTNESS OF BREATH: 1

## 2021-04-27 ASSESSMENT — PAIN DESCRIPTION - FREQUENCY: FREQUENCY: INTERMITTENT

## 2021-04-27 ASSESSMENT — PAIN SCALES - GENERAL: PAINLEVEL_OUTOF10: 7

## 2021-04-27 ASSESSMENT — PAIN DESCRIPTION - PAIN TYPE: TYPE: ACUTE PAIN

## 2021-04-27 ASSESSMENT — PAIN DESCRIPTION - LOCATION: LOCATION: CHEST

## 2021-04-27 ASSESSMENT — PAIN DESCRIPTION - DESCRIPTORS: DESCRIPTORS: STABBING

## 2021-04-27 ASSESSMENT — HEART SCORE: ECG: 1

## 2021-04-27 NOTE — ED NOTES
Patient to ED with c/o left sided chest pain that does not radiate with shortness of breath for four days. Patient reports that he feels he has been unable to urinate completely, concerned about urinary retention. Patient states he takes methadone and doses at 1100 daily. Patient is accompanied by his significant other. Patient is alert and oriented x4, placed on full cardiac monitor.         Maty Farmer RN  04/27/21 8950

## 2021-04-27 NOTE — ED PROVIDER NOTES
Every Day Smoker     Packs/day: 0.50     Types: Cigarettes    Smokeless tobacco: Never Used   Substance and Sexual Activity    Alcohol use: No    Drug use: No    Sexual activity: Not on file   Lifestyle    Physical activity     Days per week: Not on file     Minutes per session: Not on file    Stress: Not on file   Relationships    Social connections     Talks on phone: Not on file     Gets together: Not on file     Attends Confucianism service: Not on file     Active member of club or organization: Not on file     Attends meetings of clubs or organizations: Not on file     Relationship status: Not on file    Intimate partner violence     Fear of current or ex partner: Not on file     Emotionally abused: Not on file     Physically abused: Not on file     Forced sexual activity: Not on file   Other Topics Concern    Not on file   Social History Narrative    Not on file       No family history on file. Allergies:  Codeine, Pcn [penicillins], and Sulfa antibiotics    Home Medications:  Prior to Admission medications    Medication Sig Start Date End Date Taking? Authorizing Provider   losartan (COZAAR) 50 MG tablet Take 50 mg by mouth daily 4/22/21  Yes Historical Provider, MD   ALPRAZolam (XANAX) 0.25 MG tablet Take 2 tablets by mouth 3 times daily as needed for Anxiety for up to 30 days. 4/12/21 5/12/21  Isha Leger MD   benzonatate (TESSALON) 100 MG capsule Take 100 mg by mouth 3 times daily as needed for Cough    Historical Provider, MD   guanFACINE HCl (TENEX PO) Take 1 mg by mouth    Historical Provider, MD   Ibuprofen (MOTRIN PO) Take by mouth    Historical Provider, MD   methadone 5 MG/5ML solution Take 135 mg by mouth daily.  Takes at 0700 am 4/12/21   Historical Provider, MD   acetaminophen (TYLENOL) 325 MG tablet Take by mouth every 6 hours as needed for Pain    Historical Provider, MD       REVIEW OF SYSTEMS    (2-9 systems for level 4, 10 or more for level 5)      Review of Systems Constitutional: Positive for chills and diaphoresis. Negative for fever. HENT: Negative for ear pain, hearing loss and sore throat. Eyes: Negative for visual disturbance. Respiratory: Positive for shortness of breath. Cardiovascular: Positive for chest pain. Gastrointestinal: Negative for abdominal pain, constipation, diarrhea, nausea and vomiting. Genitourinary: Positive for difficulty urinating and dysuria. Musculoskeletal: Negative for arthralgias and myalgias. Neurological: Negative for numbness. Psychiatric/Behavioral: Negative for agitation and confusion. The patient is nervous/anxious. PHYSICAL EXAM   (up to 7 for level 4, 8 or more for level 5)      INITIAL VITALS:   BP (!) 151/84   Pulse 70   Temp 98.4 °F (36.9 °C)   Resp 15   Ht 6' (1.829 m)   Wt 180 lb (81.6 kg)   SpO2 95%   BMI 24.41 kg/m²     Physical Exam  Vitals signs and nursing note reviewed. Constitutional:       General: He is not in acute distress. Appearance: Normal appearance. He is well-developed. He is ill-appearing and diaphoretic. HENT:      Head: Normocephalic and atraumatic. Right Ear: External ear normal.      Left Ear: External ear normal.      Nose: Nose normal.      Mouth/Throat:      Mouth: Mucous membranes are moist.   Eyes:      Extraocular Movements: Extraocular movements intact. Conjunctiva/sclera: Conjunctivae normal.   Neck:      Musculoskeletal: Normal range of motion and neck supple. Trachea: No tracheal deviation. Cardiovascular:      Rate and Rhythm: Normal rate and regular rhythm. Heart sounds: Normal heart sounds. No murmur. No friction rub. No gallop. Pulmonary:      Effort: Pulmonary effort is normal. No respiratory distress. Breath sounds: Normal breath sounds. No wheezing, rhonchi or rales. Abdominal:      General: Abdomen is flat. There is no distension. Palpations: Abdomen is soft.       Comments: Bladder scan 100 mils   Musculoskeletal: diagnosis includes ACS, PE, cystitis, substance withdrawal, urinary retention    DIAGNOSTIC RESULTS / [facility] / [facility]   LAB RESULTS:  Results for orders placed or performed during the hospital encounter of 04/27/21   Urinalysis Reflex to Culture    Specimen: Urine, clean catch   Result Value Ref Range    Color, UA YELLOW YELLOW    Turbidity UA CLEAR CLEAR    Glucose, Ur NEGATIVE NEGATIVE    Bilirubin Urine NEGATIVE NEGATIVE    Ketones, Urine NEGATIVE NEGATIVE    Specific Gravity, UA 1.018 1.005 - 1.030    Urine Hgb NEGATIVE NEGATIVE    pH, UA 8.5 (H) 5.0 - 8.0    Protein, UA NEGATIVE NEGATIVE    Urobilinogen, Urine Normal Normal    Nitrite, Urine NEGATIVE NEGATIVE    Leukocyte Esterase, Urine NEGATIVE NEGATIVE    Urinalysis Comments       Microscopic exam not performed based on chemical results unless requested in original order.    CBC Auto Differential   Result Value Ref Range    WBC 7.8 3.5 - 11.3 k/uL    RBC 4.68 4.21 - 5.77 m/uL    Hemoglobin 14.3 13.0 - 17.0 g/dL    Hematocrit 43.0 40.7 - 50.3 %    MCV 91.9 82.6 - 102.9 fL    MCH 30.6 25.2 - 33.5 pg    MCHC 33.3 28.4 - 34.8 g/dL    RDW 12.2 11.8 - 14.4 %    Platelets 643 923 - 797 k/uL    MPV 10.3 8.1 - 13.5 fL    NRBC Automated 0.0 0.0 per 100 WBC    Differential Type NOT REPORTED     Seg Neutrophils 60 36 - 65 %    Lymphocytes 31 24 - 43 %    Monocytes 5 3 - 12 %    Eosinophils % 2 1 - 4 %    Basophils 1 0 - 2 %    Immature Granulocytes 1 (H) 0 %    Segs Absolute 4.67 1.50 - 8.10 k/uL    Absolute Lymph # 2.40 1.10 - 3.70 k/uL    Absolute Mono # 0.40 0.10 - 1.20 k/uL    Absolute Eos # 0.17 0.00 - 0.44 k/uL    Basophils Absolute 0.05 0.00 - 0.20 k/uL    Absolute Immature Granulocyte 0.06 0.00 - 0.30 k/uL    WBC Morphology NOT REPORTED     RBC Morphology NOT REPORTED     Platelet Estimate NOT REPORTED    Comprehensive Metabolic Panel w/ Reflex to MG   Result Value Ref Range    Glucose 116 (H) 70 - 99 mg/dL    BUN 6 6 - 20 mg/dL    CREATININE will send for urinalysis as well as urine trichomoniasis testing. RADIOLOGY:  Echo Complete 2d W Doppler W Color    Result Date: 4/12/2021  Transthoracic Echocardiography Report (TTE)  Patient Name Bg Fox      Date of Study               04/12/2021               Christian Baldwin   Date of      1970  Gender                      Male  Birth   Age          45 year(s)  Race                           Room Number  8888        Height:                     72 inch, 182.88 cm   Corporate ID [id number]    Weight:                     185 pounds, 83.9 kg  #   Patient Acct [de-identified]   BSA:          2.06 m^2      BMI:     25.09 kg/m^2  #   MR #         [id number]     [address] [facility]   Accession #  [id number]  Interpreting Physician      [address] [place] [address]   Fellow                   Referring Nurse                           Practitioner   Interpreting             Referring Physician         Addy Perera  Type of Study   TTE procedure:2D Echocardiogram, M-Mode, Doppler, Color Doppler. Procedure Date Date: 04/12/2021 Start: 12:13 PM Study Location: [facility] Indications:Bradycardia. History / Tech. Comments: Procedure explained to patient. Smoker. Patient Status: Inpatient Height: 72 inches Weight: 185 pounds BSA: 2.06 m^2 BMI: 25.09 kg/m^2 HR: 52 bpm CONCLUSIONS Summary Normal left ventricle size, wall thickness and function with an estimated EF > 55%. Aortic valve is sclerotic but opens well. Thickened mitral valve leaflets. Trivial tricuspid regurgitation. Mild pulmonary hypertension. Estimated right ventricular systolic pressure is [id number]. IVC Increased diameter, but still has inspiratory variation suggesting upper normal or mildly elevated RA filling pressure (i.e. CVP) .  Signature ----------------------------------------------------------------------------  Electronically signed by Kiran Salcido) on 04/12/2021 03:37  PM ---------------------------------------------------------------------------- ----------------------------------------------------------------------------  Electronically signed by Thiago VirkInterpreting physician) on 2021  07:53 PM ---------------------------------------------------------------------------- FINDINGS Left Atrium Left atrium is normal in size. Left Ventricle Normal left ventricle size, wall thickness and function with an estimated EF > 55%. No segmental wall motion abnormalities seen. Right Atrium Right atrium is normal in size. Right Ventricle Normal right ventricular size and function. Mitral Valve Thickened mitral valve leaflets. No evidence of mitral regurgitation. Aortic Valve Aortic valve is sclerotic but opens well. Aortic valve is trileaflet. No aortic insufficiency. Tricuspid Valve No obvious valvular abnormality. Trivial tricuspid regurgitation. Mild pulmonary hypertension. Estimated right ventricular systolic pressure is 80JUGG. Pulmonic Valve The pulmonic valve is normal in structure. No pulmonic insufficiency. Pericardial Effusion No significant pericardial effusion is seen. Miscellaneous Normal aortic root dimension. E/E' average = 9.0. IVC Increased diameter, but still has inspiratory variation suggesting upper normal or mildly elevated RA filling pressure (i.e. CVP) .  M-mode / 2D Measurements & Calculations:   LVIDd:4.8 cm(3.7 - 5.6 cm)        Diastolic WWQTYR:391 ml  OICX.5 cm(2.2 - 4.0 cm)        Systolic YKHDNZ:21 ml  KRYR:1.1 cm(0.6 - 1.1 cm)         Aortic Root:2.9 cm(2.0 - 3.7 cm)  LVPWd:0.9 cm(0.6 - 1.1 cm)        LA Dimension: 4 cm(1.9 - 4.0 cm)  Fractional Shortenin.17 %     LA volume/Index: 47.33 ml /23m^2  Calculated LVEF (%): 61.05 %      LVOT:2.1 cm                                    RVDd:3.9 cm   Mitral:                                 Aortic   Valve Area (P1/2-Time): 3.24 cm^2       Peak Velocity: 1.59 m/s  Peak E-Wave: 0.94 m/s                   Mean Velocity: 1.12 m/s  Peak A-Wave: 0.69 m/s                   Peak Gradient: 10.11 mmHg  E/A Ratio: 1.37                         Mean Gradient: 6 mmHg  Peak Gradient: 3.56 mmHg  Mean Gradient: 1 mmHg  Deceleration Time: 232 msec             Area (continuity): 2.45 cm^2  P1/2t: 68 msec                          AV VTI: 39.4 cm   Area (continuity): 2.6 cm^2  Mean Velocity: 0.50 m/s   Tricuspid:                              Pulmonic:   Peak TR Velocity: 2.59 m/s              Peak Velocity: 1.36 m/s  Peak TR Gradient: 26.8324 mmHg          Peak Gradient: 7.4 mmHg  Estimated RA Pressure: 15 mmHg                                           Estimated PASP: 41.83 mmHg  Diastology / Tissue Doppler Septal Wall E' velocity:0.08 m/s Septal Wall E/E':11.1 Lateral Wall E' velocity:0.13 m/s Lateral Wall E/E':7    Xr Chest (2 Vw)    Result Date: 4/11/2021  EXAMINATION: TWO XRAY VIEWS OF THE CHEST 4/11/2021 7:22 pm COMPARISON: 03/11/2013 HISTORY: ORDERING SYSTEM PROVIDED HISTORY: CP, SOA TECHNOLOGIST PROVIDED HISTORY: CP, SOA FINDINGS: The cardiomediastinal silhouette is normal in size and contour. The lungs are clear mildly hyperinflated. No pleural effusion or pneumothorax is present. No acute cardiopulmonary process     Nm Cardiac Stress Test Nuclear Imaging    Result Date: 4/12/2021  EXAMINATION: MYOCARDIAL PERFUSION IMAGING 4/12/2021 10:47 am TECHNIQUE: For the rest study, 12.6 mCi of Tc 99 labeled sestamibi were injected. SPECT images were acquired. Under cardiology supervision, 0.4mg Mitch Mandril was infused. After pharmacologic stress, 33 mCi of Tc 99 labeled sestamibi were injected. SPECT images with ECG gating were acquired. COMPARISON: None Available. HISTORY: ORDERING SYSTEM PROVIDED HISTORY: Chest pain TECHNOLOGIST PROVIDED HISTORY: Reason for Exam: Chest pain Procedure Type->Rx chest pain Reason for Exam: chest pain, hypertension.  breath sounds with bradycardia, smokes FINDINGS: Images interpreted utilizing Juliana Muhammad PACS system and [employer]. Medium size mild fixed inferior wall perfusion defect more pronounced on the rest images resolves on prone imaging. No significant fixed or reversible perfusion defect. The gated images show no wall motion abnormalities. Normal myocardial thickening. Perfusion scores are visually adjusted to account for artifact. Summed stress score:  0 Summed rest score:  0 Summed reversibility score:  0 Function: End diastolic volume:  203RZ Left ventricular ejection fraction:  47% TID score:  1.17 (scores greater than 1.39 are considered elevated for Lexiscan stress with Tc99m) Notes concerning risk stratification: Risk stratification incorporates both clinical history and some testing results. Final risk determination is the responsibility of the ordering provider as other patient information and test results may increase or decrease the risk assessment reported for this examination. Risk stratification criteria are adapted from \"Noninvasive Risk Stratification\" criteria from Pulemily Ramirez. Al, ACC/AATS/AHA/ASE/ASNC/SCAI/SCCT/STS 2017 Appropriate Use Criteria For Coronary Revascularization in Patients With Stable Ischemic Heart Disease [facility] Volume 69, Issue 17, May 2017 High risk (>3% annual death or MI) 1. Severe resting LV dysfunction (LVEF <35%) not readily explained by non coronary causes 2. Resting perfusion abnormalities greater than 10% of the myocardium in patients without prior history or evidence of MI 3. Stress-induced perfusion abnormalities encumbering greater than or equal to 10% myocardium or stress segmental scores indicating multiple vascular territories with abnormalities 4. Stress-induced LV dilatation (TID ratio greater than 1.19 for exercise and greater than 1.39 for regadenoson) Intermediate risk (1% to 3% annual death or MI) 1. Mild/moderate resting LV dysfunction (LVEF 35% to 49%) not readily explained by non coronary causes.  2. Resting perfusion abnormalities in 5%-9.9% of the myocardium in patients without a history or prior evidence of MI 3. Stress-induced perfusion abnormality encumbering 5%-9.9% of the myocardium or stress segmental scores indicating 1 vascular territory with abnormalities but without LV dilation 4. Small wall motion abnormality involving 1-2 segments and only 1 coronary bed. Low Risk (Less than 1% annual death or MI) 1. Normal or small myocardial perfusion defect at rest or with stress encumbering less than 5% of the myocardium. Diaphragmatic attenuation artifact. No stress-induced ischemia. No infarct. LVEF 47%. Risk stratification: Intermediate risk based on the LVEF. EKG  EKG Interpretation    Interpreted by emergency department physician    Rhythm: normal sinus   Rate: normal  Axis: normal  Ectopy: none  Conduction: New onset prolonged QTC at 493  ST Segments: no acute change  T Waves: no acute change  Q Waves: none    Clinical Impression: Normal sinus rhythm with prolonged QTC, nonspecific EKG    Wilder Yang MD    All EKG's are interpreted by the Emergency Department Physician who either signs or co-signs this chart in the absence of a cardiologist.    EMERGENCY DEPARTMENT COURSE:  ED Course as of Apr 27 1414   Tue Apr 27, 2021   0850 Cardiac Stress Test- W Pharm [RK]   1146 Informed by nursing the patient was asleep but provider came into the room and the patient was sitting upright urinating, stated his pain was unchanged. [TS]   1200 Patient continues to endorse chest pain and heart score is 5 with concerning arrhythmia observed on telemetry in the emergency department concerning for possible sick sinus syndrome. Given risk factors will plan to admit to the observation unit for telemetry, symptomatic control, cardiology evaluation in the morning. Patient verbalizes understanding and agreement with plan. We will also repeat urine test in the morning because the patient was endorsing dysuria but his urine test was negative here.     [TS] ED Course User Index  [RK] Guido Quintero MD  [TS] Sarah Zhou MD       PROCEDURES:  None    CONSULTS:  IP CONSULT TO CARDIOLOGY    CRITICAL CARE:  Please see attending note. FINAL IMPRESSION      1. Sinus arrhythmia    2. Other chest pain    3. Dysuria          DISPOSITION / PLAN     DISPOSITION Admitted 04/27/2021 11:52:55 AM      PATIENT REFERRED TO:  No follow-up provider specified. DISCHARGE MEDICATIONS:  New Prescriptions    No medications on file       Sarah Zhou MD  Emergency Medicine Resident    This patient was evaluated in the Emergency Department for symptoms described in the history of present illness. He/she was evaluated in the context of the global COVID-19 pandemic, which necessitated consideration that the patient might be at risk for infection with the SARS-CoV-2 virus that causes COVID-19. Institutional protocols and algorithms that pertain to the evaluation of patients at risk for COVID-19 are in a state of rapid change based on information released by regulatory bodies including the CDC and federal and state organizations. These policies and algorithms were followed during the patient's care in the ED.     (Please note that portions of thisnote were completed with a voice recognition program.  Efforts were made to edit the dictations but occasionally words are mis-transcribed.)        Sarah Zhou MD  Resident  04/27/21 2404

## 2021-04-27 NOTE — ED NOTES
Patient given warm blankets. He is resting comfortable, rr even and unlabored.      Kee Young RN  04/27/21 [id number]

## 2021-04-27 NOTE — ED PROVIDER NOTES
[address] [facility]     Emergency Department     Faculty Note/ Attestation      Pt Name: Manasa Del Valle                                       MRN: [id number]  Blancagfstephen 1970  Date of evaluation: 4/27/2021  Patients PCP:    RENETTA Travis CNP    Attestation  I performed a history and physical examination of the patient/ or directly observed  and discussed management with the resident. I reviewed the residents note and agree with the documented findings and plan of care. Any areas of disagreement are noted on the chart. I was personally present for the key portions of any procedures. I have documented in the chart those procedures where I was not present during the key portions. I have reviewed the emergency nurses triage note. I agree with the chief complaint, past medical history, past surgical history, allergies, medications, social and family history as documented unless otherwise noted below. For Physician Assistant/ Nurse Practitioner cases/documentation I have personally evaluated this patient and have completed at least one if not all key elements of the E/M (history, physical exam, and MDM). Additional findings are as noted. This patient was evaluated in the Emergency Department for symptoms described in the history of present illness. The patient was evaluated in the context of the global COVID-19 pandemic, which necessitated consideration that the patient might be at risk for infection with the SARS-CoV-2 virus that causes COVID-19. Institutional protocols and algorithms that pertain to the evaluation of patients at risk for COVID-19 are in a state of rapid change based on information released by regulatory bodies including the CDC and federal and state organizations. These policies and algorithms were followed during the patient's care in the ED.      Initial Screens:             Vitals:    Vitals:    04/27/21 0837   BP: (!) 167/102   Pulse: 74   Resp: 22   Temp: 98.4 °F (36.9 °C)   SpO2: 98%   Weight: 180 lb (81.6 kg)   Height: 6' (1.829 m)       Chief Complaint      Chief Complaint   Patient presents with    Chest Pain     SOB, urinary retentation          height is 6' (1.829 m) and weight is 180 lb (81.6 kg). His temperature is 98.4 °F (36.9 °C). His blood pressure is 167/102 (abnormal) and his pulse is 74. His respiration is 22 and oxygen saturation is 98%. DIAGNOSTIC RESULTS       RADIOLOGY:   No orders to display         LABS:  Labs Reviewed   TRICHOMONAS VAGINALI, MOLECULAR   URINE RT REFLEX TO CULTURE   CBC WITH AUTO DIFFERENTIAL   COMPREHENSIVE METABOLIC PANEL W/ REFLEX TO MG FOR LOW K   LIPASE   TROPONIN   TROPONIN         EMERGENCY DEPARTMENT COURSE:     -------------------------      BP: (!) 167/102, Temp: 98.4 °F (36.9 °C), Pulse: 74, Resp: 22    System Problem List     Patient Active Problem List   Diagnosis    Bradycardia         Comments  Chronic Prob List noted          Teixeira MD, F.A.C.E.P.   Attending Emergency Physician         Zita Dawn MD  04/27/21 0260

## 2021-04-27 NOTE — CONSULTS
Attestation signed by      Attending Physician Statement:    I have discussed the care of  Adriel Anguiano , including pertinent history and exam findings, with the Cardiology fellow/resident. I have seen and examined the patient and the key elements of all parts of the encounter have been performed by me. I agree with the assessment, plan and orders as documented by the fellow/resident, after I modified exam findings and plan of treatments, and the final version is my approved version of the assessment. Additional Comments:     Recurrent chest pain, intermittent with typical features, stress test done last admission was negative for ischemia, however does have coronary risk factors along with mild bump in troponins. Recommend coronary angiogram for definitive diagnosis and r/o any critical CA stenosis. Start heparin drip if there is an uptrend in troponins on next check. continue asa, lipitor and add coreg. I discussed in detail the risks, benefits, and alternatives to the procedure including but not limited to risk of bleeding/hematoma requiring surgical intervention, contrast induced allergy and/or nephropathy, arrythmia, CVA, MI or death. The patient verbalized understanding and wishes to proceed. Further recommendations post left heart cath. [facility]    Consult / H&P               Today's Date: 4/27/2021  Patient Name: Adriel Anguiano  Date of admission: 4/27/2021  8:45 AM  Patient's age: 45 y.o., 1970  Admission Dx: Chest pain [R07.9]    Reason for Consult:  Cardiac evaluation    Requesting Physician: Scherry Primrose, MD    CHIEF COMPLAINT:  Chest pain     History Obtained From:  patient    HISTORY OF PRESENT ILLNESS:    27-year-old male with past medical history of hypertension, smoking, opiate abuse on Suboxone presented with complaint of chest pain. Was recently here for left-sided chest pain.   Had a stress test done on 4/12/2021 and it was normal.  Echo showed normal ejection fraction without significant wall motion abnormality. Patient is complaining of chest pain. Had recurrent episodes of chest pain. At rest and with his exertion. Initial troponin was negative. Patient is also complaining of shortness of breath. Daily smoker. History of opiate use on methadone. Repeat troponin was 25. Past Medical History:   has a past medical history of Bipolar 1 disorder ([address] and Herniated cervical disc. Past Surgical History:   has a past surgical history that includes Appendectomy and fracture surgery. Home Medications:    Prior to Admission medications    Medication Sig Start Date End Date Taking? Authorizing Provider   losartan (COZAAR) 50 MG tablet Take 50 mg by mouth daily 4/22/21  Yes Historical Provider, MD   ALPRAZolam (XANAX) 0.25 MG tablet Take 2 tablets by mouth 3 times daily as needed for Anxiety for up to 30 days. 4/12/21 5/12/21  Najma Rodriguez MD   benzonatate (TESSALON) 100 MG capsule Take 100 mg by mouth 3 times daily as needed for Cough    Historical Provider, MD   guanFACINE HCl (TENEX PO) Take 1 mg by mouth    Historical Provider, MD   Ibuprofen (MOTRIN PO) Take by mouth    Historical Provider, MD   methadone 5 MG/5ML solution Take 135 mg by mouth daily.  Takes at 0700 am 4/12/21   Historical Provider, MD   acetaminophen (TYLENOL) 325 MG tablet Take by mouth every 6 hours as needed for Pain    Historical Provider, MD      Current Facility-Administered Medications: benzonatate (TESSALON) capsule 100 mg, 100 mg, Oral, TID PRN  [START ON 4/28/2021] methadone 5 MG/5ML solution 135 mg, 135 mg, Oral, Daily  ALPRAZolam (XANAX) tablet 0.5 mg, 0.5 mg, Oral, TID PRN  losartan (COZAAR) tablet 50 mg, 50 mg, Oral, Daily  sodium chloride flush 0.9 % injection 5-40 mL, 5-40 mL, Intravenous, 2 times per day  sodium chloride flush 0.9 % injection 5-40 mL, 5-40 mL, Intravenous, PRN  0.9 % sodium chloride infusion, 25 mL, Intravenous, PRN  enoxaparin (LOVENOX) injection 40 mg, 40 mg, Subcutaneous, Daily  acetaminophen (TYLENOL) tablet 650 mg, 650 mg, Oral, Q4H PRN  tiZANidine (ZANAFLEX) tablet 2 mg, 2 mg, Oral, Q6H PRN  ibuprofen (ADVIL;MOTRIN) tablet 800 mg, 800 mg, Oral, Q6H PRN    Allergies:  Codeine, Pcn [penicillins], and Sulfa antibiotics    Social History:   reports that he has been smoking cigarettes. He has been smoking about 0.50 packs per day. He has never used smokeless tobacco. He reports that he does not drink alcohol or use drugs. Family History: family history is not on file. No h/o sudden cardiac death. No for premature CAD    REVIEW OF SYSTEMS:    · Constitutional: there has been no unanticipated weight loss. There's been No change in energy level, No change in activity level. · Eyes: No visual changes or diplopia. No scleral icterus. · ENT: No Headaches  · Cardiovascular: As mentioned in H&P   · Respiratory: No previous pulmonary problems, No cough  · Gastrointestinal: No abdominal pain. No change in bowel or bladder habits. · Genitourinary: No dysuria, trouble voiding, or hematuria. · Musculoskeletal:  No gait disturbance, No weakness or joint complaints. · Integumentary: No rash or pruritis. · Neurological: No headache, diplopia, change in muscle strength, numbness or tingling. No change in gait, balance, coordination, mood, affect, memory, mentation, behavior. PHYSICAL EXAM:      BP (!) 151/84   Pulse 70   Temp 98.4 °F (36.9 °C)   Resp 15   Ht 6' (1.829 m)   Wt 180 lb (81.6 kg)   SpO2 95%   BMI 24.41 kg/m²    Constitutional and General Appearance: alert, cooperative, no distress and appears stated age  HEENT: PERRL, no cervical lymphadenopathy. No masses palpable. Normal oral mucosa  Respiratory:  · Normal excursion and expansion without use of accessory muscles  · Resp Auscultation: Good respiratory effort. No for increased work of breathing.  On auscultation: clear to auscultation bilaterally  Cardiovascular:  · The apical impulse is not displaced  · Heart tones are crisp and normal. regular S1 and S2.  · Jugular venous pulsation Normal  ·   Abdomen:   · No masses or tenderness  · Bowel sounds present  Extremities:  ·  No Cyanosis or Clubbing  ·  Lower extremity edema: No  ·  Skin: Warm and dry  Neurological:  · Alert and oriented. · Moves all extremities well  · No abnormalities of mood, affect, memory, mentation, or behavior are noted    DATA:    D    Labs:     CBC:   Recent Labs     04/27/21 0913   WBC 7.8   HGB 14.3   HCT 43.0        BMP:   Recent Labs     04/27/21 0913      K 4.3   CO2 27   BUN 6   CREATININE 0.71   LABGLOM >60   GLUCOSE 116*     BNP: No results for input(s): BNP in the last 72 hours. PT/INR: No results for input(s): PROTIME, INR in the last 72 hours. APTT:No results for input(s): APTT in the last 72 hours. CARDIAC ENZYMES:No results for input(s): CKTOTAL, CKMB, CKMBINDEX, TROPONINI in the last 72 hours. FASTING LIPID PANEL:No results found for: HDL, LDLDIRECT, LDLCALC, TRIG  LIVER PROFILE:  Recent Labs     04/27/21 0913   AST 51*   ALT 48*   LABALBU 4.1       IMPRESSION:    Patient Active Problem List   Diagnosis    Bradycardia    Chest pain     Assessment:     1. Recurrent episodes of chest pain. 2. Negative stress test on 04/12/2021   3. Preserved EF on ECHO 04/12/2021   4. HTN   5. Opiate abuse on Methadone   6   smoker. RECOMMENDATIONS:  1 EKG reviewed. Normal sinus rhythm without significant ST or T wave abnormality. On aspirin 81 mg and Lipitor 40 mg. And troponin. If troponin is trending upwards will start low-dose heparin drip. N.p.o. after midnight. 2 He is complaining of recurrent episodes of chest pain at rest and with exertion. Also complaining of shortness of breath. Patient is significant risk factor for coronary artery disease. 3. Resume Metahdone as per primary.      Will proceed with left heart cath tomorrow. Need cardiac cath for risk stratification. Risk and benefits of cardiac catheterization were discussed in detail. Risk of bleeding, requiring blood transfusion, vascular complication requiring surgery, renal insufficieny with need of dialysis, CVA, MI, death and anesthesia complications including intubation were discussed. Patient agrees to proceed and verbalizes understanding. Discussed with patient and Nurse.     Electronically signed by Aniceto Estrella MD on 4/27/2021 at 12:33 PM    [facility]      [phone]

## 2021-04-27 NOTE — ED NOTES
Patient asleep on stretcher, rr even and unlabored.        Jean Paul Olivera, [place]  04/27/21 1100

## 2021-04-27 NOTE — CARE COORDINATION
Case Management Initial Discharge Plan  Kimo Quinteros,             Met with:patient to discuss discharge plans. Information verified: address, contacts, phone number, , insurance Yes    Emergency Contact/Next of Kin name & number:   Janett Farmer      Parent  Other [phone]         PCP: RENETTA Nicolas CNP  Date of last visit: 1 week    Insurance Provider: [address] [place] [address]*    Discharge Planning    Living Arrangements:  Spouse/Significant Other   Support Systems:  Spouse/Significant Other    Home has 1 stories  0 stairs to climb to get into front door, elevatorstairs to climb to reach second floor  Location of bedroom/bathroom in home main    Patient able to perform ADL's:Independent    Current Services (outpatient & in home) none  DME equipment: none  DME provider: none    Receiving oral anticoagulation therapy? No    If indicated:   Physician managing anticoagulation treatment: n/a  Where does patient obtain lab work for ATC treatment? n/a      Potential Assistance Needed:  N/A    Patient agreeable to home care: No  Gomer of choice provided:  n/a    Prior SNF/Rehab Placement and Facility: none  Agreeable to SNF/Rehab: No  Gomer of choice provided: n/a     Evaluation: no    Expected Discharge date:       Patient expects to be discharged to:  home  Follow Up Appointment: Best Day/ Time:      Transportation provider: self  Transportation arrangements needed for discharge: No    Readmission Risk              Risk of Unplanned Readmission:        0             Does patient have a readmission risk score greater than 14?: n/a  If yes, follow-up appointment must be made within 7 days of discharge.      Goals of Care: chest pain resolved      Discharge Plan: Home independently no skilled needs has transportation          Electronically signed by Miladis Wynn RN on 21 at 3:09 PM EDT

## 2021-04-27 NOTE — PROGRESS NOTES
RAPID Covid 19 swab taken from left nare, labeled, placed in red dot bag, and handed off to second healthcare worker outside of room for transport to laboratory per hospital policy and procedure. Patient tolerated procedure well.     Swab sent to Lab via tube system

## 2021-04-27 NOTE — ED NOTES
The following labs were labeled with patient stickers & tubed to lab;    []Lavender   []On Ice  []Blue  []Green/ Yellow  []Green/ Black []On Ice  []Pink  []Red  []Yellow    [x]COVID-19 Swab [x]Rapid    []Urine Sample  []Pelvic Cultures    []Blood Cultures            Jean Paul Olivera RN  04/27/21 9742

## 2021-04-27 NOTE — ED NOTES
Dr Felicia Varma at bedside to discuss admission to observation unit.      John Belle RN  04/27/21 1990

## 2021-04-28 ENCOUNTER — APPOINTMENT (OUTPATIENT)
Dept: CARDIAC CATH/INVASIVE PROCEDURES | Age: 51
End: 2021-04-28
Payer: COMMERCIAL

## 2021-04-28 ENCOUNTER — APPOINTMENT (OUTPATIENT)
Dept: GENERAL RADIOLOGY | Age: 51
End: 2021-04-28
Payer: COMMERCIAL

## 2021-04-28 VITALS
RESPIRATION RATE: 16 BRPM | HEIGHT: 72 IN | BODY MASS INDEX: 24.38 KG/M2 | TEMPERATURE: 98.5 F | HEART RATE: 60 BPM | SYSTOLIC BLOOD PRESSURE: 135 MMHG | WEIGHT: 180 LBS | OXYGEN SATURATION: 95 % | DIASTOLIC BLOOD PRESSURE: 85 MMHG

## 2021-04-28 LAB
-: NORMAL
ABSOLUTE EOS #: 0.23 K/UL (ref 0–0.4)
ABSOLUTE IMMATURE GRANULOCYTE: 0 K/UL (ref 0–0.3)
ABSOLUTE LYMPH #: 4.41 K/UL (ref 1–4.8)
ABSOLUTE MONO #: 0.7 K/UL (ref 0.1–0.8)
AMORPHOUS: NORMAL
ANION GAP SERPL CALCULATED.3IONS-SCNC: 12 MMOL/L (ref 9–17)
BACTERIA: NORMAL
BASOPHILS # BLD: 0 % (ref 0–2)
BASOPHILS ABSOLUTE: 0 K/UL (ref 0–0.2)
BILIRUBIN URINE: NEGATIVE
BUN BLDV-MCNC: 10 MG/DL (ref 6–20)
BUN/CREAT BLD: NORMAL (ref 9–20)
CALCIUM SERPL-MCNC: 9 MG/DL (ref 8.6–10.4)
CASTS UA: NORMAL /LPF (ref 0–8)
CHLORIDE BLD-SCNC: 101 MMOL/L (ref 98–107)
CO2: 26 MMOL/L (ref 20–31)
COLOR: ABNORMAL
COMMENT UA: ABNORMAL
CREAT SERPL-MCNC: 0.73 MG/DL (ref 0.7–1.2)
CRYSTALS, UA: NORMAL /HPF
DIFFERENTIAL TYPE: ABNORMAL
EKG ATRIAL RATE: 65 BPM
EKG ATRIAL RATE: 80 BPM
EKG P AXIS: 72 DEGREES
EKG P AXIS: 76 DEGREES
EKG P-R INTERVAL: 120 MS
EKG P-R INTERVAL: 128 MS
EKG Q-T INTERVAL: 428 MS
EKG Q-T INTERVAL: 550 MS
EKG QRS DURATION: 90 MS
EKG QRS DURATION: 92 MS
EKG QTC CALCULATION (BAZETT): 493 MS
EKG QTC CALCULATION (BAZETT): 572 MS
EKG R AXIS: 70 DEGREES
EKG R AXIS: 76 DEGREES
EKG T AXIS: 62 DEGREES
EKG T AXIS: 66 DEGREES
EKG VENTRICULAR RATE: 65 BPM
EKG VENTRICULAR RATE: 80 BPM
EOSINOPHILS RELATIVE PERCENT: 2 % (ref 1–4)
EPITHELIAL CELLS UA: NORMAL /HPF (ref 0–5)
GFR AFRICAN AMERICAN: >60 ML/MIN
GFR NON-AFRICAN AMERICAN: >60 ML/MIN
GFR SERPL CREATININE-BSD FRML MDRD: NORMAL ML/MIN/{1.73_M2}
GFR SERPL CREATININE-BSD FRML MDRD: NORMAL ML/MIN/{1.73_M2}
GLUCOSE BLD-MCNC: 98 MG/DL (ref 70–99)
GLUCOSE URINE: NEGATIVE
HCT VFR BLD CALC: 45.1 % (ref 40.7–50.3)
HEMOGLOBIN: 14.4 G/DL (ref 13–17)
IMMATURE GRANULOCYTES: 0 %
KETONES, URINE: ABNORMAL
LEUKOCYTE ESTERASE, URINE: NEGATIVE
LYMPHOCYTES # BLD: 38 % (ref 24–44)
MCH RBC QN AUTO: 30.3 PG (ref 25.2–33.5)
MCHC RBC AUTO-ENTMCNC: 31.9 G/DL (ref 28.4–34.8)
MCV RBC AUTO: 94.7 FL (ref 82.6–102.9)
MONOCYTES # BLD: 6 % (ref 1–7)
MORPHOLOGY: NORMAL
MUCUS: NORMAL
NITRITE, URINE: NEGATIVE
NRBC AUTOMATED: 0 PER 100 WBC
OTHER OBSERVATIONS UA: NORMAL
PDW BLD-RTO: 12.1 % (ref 11.8–14.4)
PH UA: 6.5 (ref 5–8)
PLATELET # BLD: 340 K/UL (ref 138–453)
PLATELET ESTIMATE: ABNORMAL
PMV BLD AUTO: 10.4 FL (ref 8.1–13.5)
POTASSIUM SERPL-SCNC: 4.3 MMOL/L (ref 3.7–5.3)
PROTEIN UA: NEGATIVE
RBC # BLD: 4.76 M/UL (ref 4.21–5.77)
RBC # BLD: ABNORMAL 10*6/UL
RBC UA: NORMAL /HPF (ref 0–4)
RENAL EPITHELIAL, UA: NORMAL /HPF
SEG NEUTROPHILS: 54 % (ref 36–66)
SEGMENTED NEUTROPHILS ABSOLUTE COUNT: 6.26 K/UL (ref 1.8–7.7)
SODIUM BLD-SCNC: 139 MMOL/L (ref 135–144)
SOURCE: NORMAL
SPECIFIC GRAVITY UA: 1.02 (ref 1–1.03)
TRICHOMONAS VAGINALI, MOLECULAR: NEGATIVE
TRICHOMONAS: NORMAL
TROPONIN INTERP: NORMAL
TROPONIN T: NORMAL NG/ML
TROPONIN, HIGH SENSITIVITY: 10 NG/L (ref 0–22)
TURBIDITY: CLEAR
URINE HGB: ABNORMAL
UROBILINOGEN, URINE: NORMAL
WBC # BLD: 11.6 K/UL (ref 3.5–11.3)
WBC # BLD: ABNORMAL 10*3/UL
WBC UA: NORMAL /HPF (ref 0–5)
YEAST: NORMAL

## 2021-04-28 PROCEDURE — 2500000003 HC RX 250 WO HCPCS

## 2021-04-28 PROCEDURE — 71045 X-RAY EXAM CHEST 1 VIEW: CPT

## 2021-04-28 PROCEDURE — 81001 URINALYSIS AUTO W/SCOPE: CPT

## 2021-04-28 PROCEDURE — 93010 ELECTROCARDIOGRAM REPORT: CPT | Performed by: INTERNAL MEDICINE

## 2021-04-28 PROCEDURE — 2709999900 HC NON-CHARGEABLE SUPPLY

## 2021-04-28 PROCEDURE — 6370000000 HC RX 637 (ALT 250 FOR IP): Performed by: NURSE PRACTITIONER

## 2021-04-28 PROCEDURE — 84484 ASSAY OF TROPONIN QUANT: CPT

## 2021-04-28 PROCEDURE — 2580000003 HC RX 258: Performed by: EMERGENCY MEDICINE

## 2021-04-28 PROCEDURE — 6370000000 HC RX 637 (ALT 250 FOR IP): Performed by: STUDENT IN AN ORGANIZED HEALTH CARE EDUCATION/TRAINING PROGRAM

## 2021-04-28 PROCEDURE — 36415 COLL VENOUS BLD VENIPUNCTURE: CPT

## 2021-04-28 PROCEDURE — 6370000000 HC RX 637 (ALT 250 FOR IP): Performed by: EMERGENCY MEDICINE

## 2021-04-28 PROCEDURE — 80048 BASIC METABOLIC PNL TOTAL CA: CPT

## 2021-04-28 PROCEDURE — 96372 THER/PROPH/DIAG INJ SC/IM: CPT

## 2021-04-28 PROCEDURE — 6360000004 HC RX CONTRAST MEDICATION

## 2021-04-28 PROCEDURE — 93005 ELECTROCARDIOGRAM TRACING: CPT | Performed by: EMERGENCY MEDICINE

## 2021-04-28 PROCEDURE — C1760 CLOSURE DEV, VASC: HCPCS

## 2021-04-28 PROCEDURE — 6360000002 HC RX W HCPCS: Performed by: EMERGENCY MEDICINE

## 2021-04-28 PROCEDURE — 93454 CORONARY ARTERY ANGIO S&I: CPT | Performed by: INTERNAL MEDICINE

## 2021-04-28 PROCEDURE — G0378 HOSPITAL OBSERVATION PER HR: HCPCS

## 2021-04-28 PROCEDURE — 85025 COMPLETE CBC W/AUTO DIFF WBC: CPT

## 2021-04-28 PROCEDURE — C1769 GUIDE WIRE: HCPCS

## 2021-04-28 PROCEDURE — C1894 INTRO/SHEATH, NON-LASER: HCPCS

## 2021-04-28 PROCEDURE — 6360000002 HC RX W HCPCS

## 2021-04-28 RX ORDER — FENTANYL CITRATE 50 UG/ML
25 INJECTION, SOLUTION INTRAMUSCULAR; INTRAVENOUS
Status: DISCONTINUED | OUTPATIENT
Start: 2021-04-28 | End: 2021-04-28 | Stop reason: HOSPADM

## 2021-04-28 RX ORDER — SODIUM CHLORIDE 9 MG/ML
25 INJECTION, SOLUTION INTRAVENOUS PRN
Status: CANCELLED | OUTPATIENT
Start: 2021-04-28

## 2021-04-28 RX ORDER — SODIUM CHLORIDE 0.9 % (FLUSH) 0.9 %
5-40 SYRINGE (ML) INJECTION PRN
Status: CANCELLED | OUTPATIENT
Start: 2021-04-28

## 2021-04-28 RX ORDER — SODIUM CHLORIDE 0.9 % (FLUSH) 0.9 %
5-40 SYRINGE (ML) INJECTION EVERY 12 HOURS SCHEDULED
Status: CANCELLED | OUTPATIENT
Start: 2021-04-28

## 2021-04-28 RX ORDER — OXYCODONE HYDROCHLORIDE 5 MG/1
10 TABLET ORAL ONCE
Status: COMPLETED | OUTPATIENT
Start: 2021-04-28 | End: 2021-04-28

## 2021-04-28 RX ORDER — ACETAMINOPHEN 325 MG/1
650 TABLET ORAL EVERY 4 HOURS PRN
Status: CANCELLED | OUTPATIENT
Start: 2021-04-28

## 2021-04-28 RX ADMIN — LOSARTAN POTASSIUM 50 MG: 50 TABLET, FILM COATED ORAL at 08:21

## 2021-04-28 RX ADMIN — SODIUM CHLORIDE, PRESERVATIVE FREE 10 ML: 5 INJECTION INTRAVENOUS at 08:20

## 2021-04-28 RX ADMIN — OXYCODONE HYDROCHLORIDE 10 MG: 5 TABLET ORAL at 15:35

## 2021-04-28 RX ADMIN — ALPRAZOLAM 0.5 MG: 0.25 TABLET ORAL at 08:19

## 2021-04-28 RX ADMIN — ENOXAPARIN SODIUM 40 MG: 40 INJECTION, SOLUTION INTRAVENOUS; SUBCUTANEOUS at 09:00

## 2021-04-28 RX ADMIN — GABAPENTIN 100 MG: 100 CAPSULE ORAL at 08:20

## 2021-04-28 RX ADMIN — METHADONE HYDROCHLORIDE 135 MG: 5 SOLUTION ORAL at 09:30

## 2021-04-28 RX ADMIN — ALPRAZOLAM 0.5 MG: 0.25 TABLET ORAL at 14:42

## 2021-04-28 RX ADMIN — ASPIRIN 81 MG: 81 TABLET, CHEWABLE ORAL at 08:22

## 2021-04-28 RX ADMIN — TIZANIDINE 2 MG: 2 TABLET ORAL at 14:43

## 2021-04-28 RX ADMIN — TIZANIDINE 2 MG: 2 TABLET ORAL at 08:19

## 2021-04-28 RX ADMIN — GABAPENTIN 100 MG: 100 CAPSULE ORAL at 14:43

## 2021-04-28 RX ADMIN — IBUPROFEN 800 MG: 800 TABLET, FILM COATED ORAL at 06:47

## 2021-04-28 ASSESSMENT — PAIN SCALES - GENERAL
PAINLEVEL_OUTOF10: 7
PAINLEVEL_OUTOF10: 7
PAINLEVEL_OUTOF10: 8

## 2021-04-28 NOTE — H&P
[facility]  CDU / OBSERVATION ENCOUNTER  RESIDENT NOTE     Pt Name: Jovanny Fairchild  MRN: [id number]  [place] 1970  Date of evaluation: 4/27/21  Patient's PCP is :  RENETTA Sahu CNP    CHIEF COMPLAINT       Chief Complaint   Patient presents with    Chest Pain     SOB, urinary retentation         HISTORY OF PRESENT ILLNESS    Jovanny Fairchild is a 45 y.o. male who presents with complaints of left-sided chest pain for approximately 2 days. Patient had similar chest pain in early April and had a stress test that he states showed intermediate results. Patient is a daily smoker states he has no cardiac history but has an extensive family cardiac history. Patient also complains of urinary retention for approximately 2 days. Location/Symptom: Left-sided chest pain  Timing/Onset: Approximately 2 days  Provocation: Unknown  Quality: Sharp  Radiation: Nonradiating  Severity: 8/10  Timing/Duration: Consistent  Modifying Factors: n/a    REVIEW OF SYSTEMS       Review of Systems   Constitutional: Negative for appetite change, chills, fatigue and fever. HENT: Negative for trouble swallowing. Eyes: Negative for visual disturbance. Respiratory: Negative for shortness of breath and wheezing. Cardiovascular: Positive for chest pain. Gastrointestinal: Negative for abdominal pain, nausea and vomiting. Genitourinary: Negative for difficulty urinating. Musculoskeletal: Negative for arthralgias. Neurological: Negative for dizziness and syncope. Psychiatric/Behavioral: Negative for agitation and behavioral problems. (PQRS) Advance directives on face sheet per hospital policy. No change unless specifically mentioned in chart    [address]    has a past medical history of Bipolar 1 disorder ([address] and Herniated cervical disc. I have reviewed the past medical history with the patient and it is pertinent to this complaint.       SURGICAL HISTORY      has a past surgical history that includes Appendectomy and fracture surgery. I have reviewed and agree with Surgical History entered and it is pertinent to this complaint. CURRENT MEDICATIONS     benzonatate (TESSALON) capsule 100 mg, TID PRN  [START ON 4/28/2021] methadone 5 MG/5ML solution 135 mg, Daily  ALPRAZolam (XANAX) tablet 0.5 mg, TID PRN  losartan (COZAAR) tablet 50 mg, Daily  sodium chloride flush 0.9 % injection 5-40 mL, 2 times per day  sodium chloride flush 0.9 % injection 5-40 mL, PRN  0.9 % sodium chloride infusion, PRN  enoxaparin (LOVENOX) injection 40 mg, Daily  acetaminophen (TYLENOL) tablet 650 mg, Q4H PRN  tiZANidine (ZANAFLEX) tablet 2 mg, Q6H PRN  ibuprofen (ADVIL;MOTRIN) tablet 800 mg, Q6H PRN  aspirin chewable tablet 81 mg, Daily  atorvastatin (LIPITOR) tablet 40 mg, Nightly        All medication charted and reviewed. ALLERGIES     is allergic to codeine; pcn [penicillins]; and sulfa antibiotics. FAMILY HISTORY     has no family status information on file. family history is not on file. The patient denies any pertinent family history. I have reviewed and agree with the family history entered. I have reviewed the Family History and it is not significant to the case    SOCIAL HISTORY      reports that he has been smoking cigarettes. He has been smoking about 0.50 packs per day. He has never used smokeless tobacco. He reports that he does not drink alcohol or use drugs. I have reviewed and agree with all Social.  There are no concerns for substance abuse/use. PHYSICAL EXAM     INITIAL VITALS:  height is 6' (1.829 m) and weight is 180 lb (81.6 kg). His temperature is 98.4 °F (36.9 °C). His blood pressure is 146/86 (abnormal) and his pulse is 67. His respiration is 12 and oxygen saturation is 95%. Physical Exam  Vitals signs reviewed. HENT:      Head: Normocephalic.       Mouth/Throat:      Mouth: Mucous membranes are moist.   Eyes:      Pupils: Pupils are equal, round, and reactive to light. Cardiovascular:      Rate and Rhythm: Normal rate. Pulses: Normal pulses. Heart sounds: Normal heart sounds. Pulmonary:      Effort: Pulmonary effort is normal.   Abdominal:      General: Bowel sounds are normal.   Musculoskeletal: Normal range of motion. Skin:     General: Skin is warm. Neurological:      General: No focal deficit present. Mental Status: He is alert and oriented to person, place, and time. DIFFERENTIAL DIAGNOSIS/MDM:     DDx: Unstable angina    DIAGNOSTIC RESULTS       RADIOLOGY:   I directly visualized the following  images and reviewed the radiologist interpretations:    No results found. LABS:  I have reviewed and interpreted all available lab results.   Labs Reviewed   URINE RT REFLEX TO CULTURE - Abnormal; Notable for the following components:       Result Value    pH, UA 8.5 (*)     All other components within normal limits   CBC WITH AUTO DIFFERENTIAL - Abnormal; Notable for the following components:    Immature Granulocytes 1 (*)     All other components within normal limits   COMPREHENSIVE METABOLIC PANEL W/ REFLEX TO MG FOR LOW K - Abnormal; Notable for the following components:    Glucose 116 (*)     Anion Gap 8 (*)     ALT 48 (*)     AST 51 (*)     Total Bilirubin 0.24 (*)     All other components within normal limits   TROPONIN - Abnormal; Notable for the following components:    Troponin, High Sensitivity 26 (*)     All other components within normal limits   COVID-19, RAPID   TRICHOMONAS VAGINALI, MOLECULAR   LIPASE   TROPONIN   TROPONIN   TROPONIN   CBC WITH AUTO DIFFERENTIAL   BASIC METABOLIC PANEL   URINALYSIS       SCREENING TOOLS:    HEART Risk Score for Chest Pain Patients   History and Physical Exam Suspicion Level  (Nausea, Vomiting, Diaphoresis, Radiation, Exertion)   Slightly Suspicious (0 pts)   Moderately Suspicious (1 pt)   Highly Suspicious (2 pts)   EKG Interpretation   Normal (0 pts)   Non-Specific Repolarization Disturbance (1 pt)   Significant ST-Depression (2 pts)   Age of Patient (in years)   = 39 (0 pts)   46-64 (1 pt)   = 65 (2 pts)   Risk Factors   No Risk Factors (0 pts)   1-2 Risk Factors (1 pt)   = 3 Risk Factors (2 pts)   Risk Factors Include:   Hypercholesterolemia   Hypertension   Diabetes Mellitus   Cigarette smoking   Positive family history   Obesity   CAD   (SLE, CKDz, HIV, Cocaine abuse)   Troponin Levels   = Normal Limit (0 pts)   1-3 Times Normal Limit (1 pt)   > 3 Times Normal Limit (2 pts)  TOTAL:    Percent Risk for Major Adverse Cardiac Event (MACE)  0-3 pts indicates low risk for MACE   2.5% (DISCHARGE)   4-7 pts indicates moderate risk for MACE  20.3% (OBS)  8-10 pts indicates high risk for MACE  72.7% (EARLY INVASIVE [place])    CDU GREGORIA / Lynn Magana is a 45 y.o. male who presents with complaints of left-sided chest pain nonradiating. Patient also complained of urinary retention for approximately 2 days. Patient denies previous cardiac history but states he had a stress test in April with intermediate results. Patient states there is an extensive family history per patient report. Given clinical presentation will admit for further observation and evaluation. 1. Inpatient consult to cardiology-appreciate recommendations  · Symptom management  · Continue home medications and pain control  · Monitor vitals, labs, and imaging  · DISPO: pending consults and clinical improvement    CONSULTS:    IP CONSULT TO CARDIOLOGY    PROCEDURES:  Not indicated       PATIENT REFERRED TO:    No follow-up provider specified. --  My Supervising Physician for today is Dr. Rancho Bucio  We discussed and agreed upon a treatment plan  Jamey Colón, [address] [place] [address]   Emergency Medicine Resident     This dictation was generated by voice recognition computer software. Although all attempts are made to edit the dictation for accuracy, there may be errors in the transcription that are not intended.

## 2021-04-28 NOTE — PROGRESS NOTES
Discharge teaching and instructions completed with patient using teachback method. AVS reviewed. Patient voiced understanding regarding follow up appointments, and care of self at home. Discharged home with family. All questions answered.

## 2021-04-28 NOTE — PROGRESS NOTES
Comprehensive Nutrition Assessment    Type and Reason for Visit:  Initial, Positive Nutrition Screen (Weight Loss, Poor Intake/Appetite)    Nutrition Recommendations/Plan: Continue current diet. Start high calorie oral nutrition supplement (Ensure Enlive) 2x/d. Monitor/encourage intakes. Nutrition Assessment:  Patient seen for report of weight loss and poor intake/appetite. Writer unable to speak with patient at time of visit as he was getting a Cardiac Catheterization. Per EHR, patient has a usual body weight of 185 lb. Note 9 lb (4.9%) weight loss x past 5 months, per EHR, which is not considered clinically significant. Will add high calorie oral nutrition supplement (Ensure Enlive) due to patient c/o poor appetite. Malnutrition Assessment:  Malnutrition Status:  Insufficient data    Context:  Acute Illness     Findings of the 6 clinical characteristics of malnutrition:  Energy Intake:  Mild decrease in energy intake   Weight Loss:  4.9% x 5 months     Body Fat Loss:  Unable to assess     Muscle Mass Loss:  Unable to assess    Fluid Accumulation:  No significant fluid accumulation     Strength:  Not Performed    Estimated Daily Nutrient Needs:  Energy (kcal):  5174-9967 kcal/d (25-28 kcal/kg); Weight Used for Energy Requirements:  Admission(81.6 kg)     Protein (g):  100-115 g protein/d (1.2-1.4 g/kg); Weight Used for Protein Requirements:  Admission        Fluid (ml/day):  2450 mL fluid/d; Method Used for Fluid Requirements:  ml/Kg(30)      Nutrition Related Findings:  Labs/Meds reviewed. Date of last BM unknown. Wounds:  None       Current Nutrition Therapies:    DIET GENERAL;     Anthropometric Measures:  · Height: 6' (182.9 cm)  · Current Body Weight: 180 lb (81.6 kg)(Stated)   · Admission Body Weight: 180 lb (81.6 kg)(Stated)    · Usual Body Weight: 185 lb (83.9 kg)     · Ideal Body Weight: 178 lbs; % Ideal Body Weight 101.1 %   · BMI: 24.4  · Adjusted Body Weight:  No Adjustment   · BMI

## 2021-04-28 NOTE — DISCHARGE SUMMARY
CDU Discharge Summary        Patient:  Steve Labor  YOB: 1970    MRN: [id number]   Acct: [de-identified]    Primary Care Physician: RENETTA Montana CNP    Admit date:  4/27/2021  8:45 AM  Discharge date: 3:18 PM 4/28/2021    Discharge Diagnoses:     1.)  Acute chest pain likely due to musculoskeletal      Follow-up:  Call today/tomorrow for a follow up appointment with RENETTA Montana CNP , or return to the Emergency Room with worsening symptoms    Stressed to patient the importance of following up with primary care doctor for further workup/management of symptoms. Pt verbalizes understanding and agrees with plan. Discharge Medication Changes:       Medication List      CONTINUE taking these medications    acetaminophen 325 MG tablet  Commonly known as: TYLENOL     ALPRAZolam 0.25 MG tablet  Commonly known as: Xanax  Take 2 tablets by mouth 3 times daily as needed for Anxiety for up to 30 days. benzonatate 100 MG capsule  Commonly known as: TESSALON     gabapentin 100 MG capsule  Commonly known as: NEURONTIN     losartan 50 MG tablet  Commonly known as: COZAAR     methadone 5 MG/5ML solution     MOTRIN PO     TENEX PO            Diet:  DIET GENERAL;  Dietary Nutrition Supplements: Standard High Calorie Oral Supplement, advance as tolerated     Activity:  As tolerated    Consultants: IP CONSULT TO CARDIOLOGY    Procedures:  Not indicated     Diagnostic Test:   Results for orders placed or performed during the hospital encounter of 04/27/21   Trichomonas Vaginali, Molecular    Specimen: Urine   Result Value Ref Range    Source . URINE     Trichomonas Vaginali, Molecular NEGATIVE NEGATIVE   COVID-19, Rapid    Specimen: Nasopharyngeal Swab   Result Value Ref Range    Specimen Description . NASOPHARYNGEAL SWAB     SARS-CoV-2, Rapid Not Detected Not Detected   Urinalysis Reflex to Culture    Specimen: Urine, clean catch   Result Value Ref Range    Color, UA YELLOW YELLOW    Turbidity UA CLEAR CLEAR    Glucose, Ur NEGATIVE NEGATIVE    Bilirubin Urine NEGATIVE NEGATIVE    Ketones, Urine NEGATIVE NEGATIVE    Specific Gravity, UA 1.018 1.005 - 1.030    Urine Hgb NEGATIVE NEGATIVE    pH, UA 8.5 (H) 5.0 - 8.0    Protein, UA NEGATIVE NEGATIVE    Urobilinogen, Urine Normal Normal    Nitrite, Urine NEGATIVE NEGATIVE    Leukocyte Esterase, Urine NEGATIVE NEGATIVE    Urinalysis Comments       Microscopic exam not performed based on chemical results unless requested in original order.    CBC Auto Differential   Result Value Ref Range    WBC 7.8 3.5 - 11.3 k/uL    RBC 4.68 4.21 - 5.77 m/uL    Hemoglobin 14.3 13.0 - 17.0 g/dL    Hematocrit 43.0 40.7 - 50.3 %    MCV 91.9 82.6 - 102.9 fL    MCH 30.6 25.2 - 33.5 pg    MCHC 33.3 28.4 - 34.8 g/dL    RDW 12.2 11.8 - 14.4 %    Platelets 687 586 - 069 k/uL    MPV 10.3 8.1 - 13.5 fL    NRBC Automated 0.0 0.0 per 100 WBC    Differential Type NOT REPORTED     Seg Neutrophils 60 36 - 65 %    Lymphocytes 31 24 - 43 %    Monocytes 5 3 - 12 %    Eosinophils % 2 1 - 4 %    Basophils 1 0 - 2 %    Immature Granulocytes 1 (H) 0 %    Segs Absolute 4.67 1.50 - 8.10 k/uL    Absolute Lymph # 2.40 1.10 - 3.70 k/uL    Absolute Mono # 0.40 0.10 - 1.20 k/uL    Absolute Eos # 0.17 0.00 - 0.44 k/uL    Basophils Absolute 0.05 0.00 - 0.20 k/uL    Absolute Immature Granulocyte 0.06 0.00 - 0.30 k/uL    WBC Morphology NOT REPORTED     RBC Morphology NOT REPORTED     Platelet Estimate NOT REPORTED    Comprehensive Metabolic Panel w/ Reflex to MG   Result Value Ref Range    Glucose 116 (H) 70 - 99 mg/dL    BUN 6 6 - 20 mg/dL    CREATININE 0.71 0.70 - 1.20 mg/dL    Bun/Cre Ratio NOT REPORTED 9 - 20    Calcium 9.6 8.6 - 10.4 mg/dL    Sodium 137 135 - 144 mmol/L    Potassium 4.3 3.7 - 5.3 mmol/L    Chloride 102 98 - 107 mmol/L    CO2 27 20 - 31 mmol/L    Anion Gap 8 (L) 9 - 17 mmol/L    Alkaline Phosphatase 94 40 - 129 U/L    ALT 48 (H) 5 - 41 U/L    AST 51 (H) <40 U/L    Total Bilirubin 0.24 (L) 0.3 Value Ref Range    WBC 11.6 (H) 3.5 - 11.3 k/uL    RBC 4.76 4.21 - 5.77 m/uL    Hemoglobin 14.4 13.0 - 17.0 g/dL    Hematocrit 45.1 40.7 - 50.3 %    MCV 94.7 82.6 - 102.9 fL    MCH 30.3 25.2 - 33.5 pg    MCHC 31.9 28.4 - 34.8 g/dL    RDW 12.1 11.8 - 14.4 %    Platelets 482 771 - 668 k/uL    MPV 10.4 8.1 - 13.5 fL    NRBC Automated 0.0 0.0 per 100 WBC    Differential Type NOT REPORTED     WBC Morphology NOT REPORTED     RBC Morphology NOT REPORTED     Platelet Estimate NOT REPORTED     Immature Granulocytes 0 0 %    Seg Neutrophils 54 36 - 66 %    Lymphocytes 38 24 - 44 %    Monocytes 6 1 - 7 %    Eosinophils % 2 1 - 4 %    Basophils 0 0 - 2 %    Absolute Immature Granulocyte 0.00 0.00 - 0.30 k/uL    Segs Absolute 6.26 1.8 - 7.7 k/uL    Absolute Lymph # 4.41 1.0 - 4.8 k/uL    Absolute Mono # 0.70 0.1 - 0.8 k/uL    Absolute Eos # 0.23 0.0 - 0.4 k/uL    Basophils Absolute 0.00 0.0 - 0.2 k/uL    Morphology Normal    Microscopic Urinalysis   Result Value Ref Range    -          WBC, UA 5 TO 10 0 - 5 /HPF    RBC, UA 5 TO 10 0 - 4 /HPF    Casts UA  0 - 8 /LPF     10 TO 20 HYALINE Reference range defined for non-centrifuged specimen. Crystals, UA NOT REPORTED None /HPF    Epithelial Cells UA 5 TO 10 0 - 5 /HPF    Renal Epithelial, UA NOT REPORTED 0 /HPF    Bacteria, UA NOT REPORTED None    Mucus, UA NOT REPORTED None    Trichomonas, UA NOT REPORTED None    Amorphous, UA NOT REPORTED None    Other Observations UA NOT REPORTED NOT REQ.     Yeast, UA NOT REPORTED None   Troponin   Result Value Ref Range    Troponin, High Sensitivity 10 0 - 22 ng/L    Troponin T NOT REPORTED <0.03 ng/mL    Troponin Interp NOT REPORTED    EKG 12 Lead   Result Value Ref Range    Ventricular Rate 65 BPM    Atrial Rate 65 BPM    P-R Interval 128 ms    QRS Duration 92 ms    Q-T Interval 550 ms    QTc Calculation (Bazett) 572 ms    P Axis 76 degrees    R Axis 76 degrees    T Axis 66 degrees   EKG 12 Lead   Result Value Ref Range    Ventricular Rate 80 BPM    Atrial Rate 80 BPM    P-R Interval 120 ms    QRS Duration 90 ms    Q-T Interval 428 ms    QTc Calculation (Bazett) 493 ms    P Axis 72 degrees    R Axis 70 degrees    T Axis 62 degrees   EKG 12 Lead   Result Value Ref Range    Ventricular Rate 59 BPM    Atrial Rate 59 BPM    P-R Interval 126 ms    QRS Duration 86 ms    Q-T Interval 494 ms    QTc Calculation (Bazett) 489 ms    P Axis 73 degrees    R Axis 64 degrees    T Axis 59 degrees     Xr Chest Portable    Result Date: 4/28/2021  EXAMINATION: ONE XRAY VIEW OF THE CHEST 4/28/2021 10:13 am COMPARISON: 04/11/2021 HISTORY: ORDERING SYSTEM PROVIDED HISTORY: chest pain with slight elevation in WBC TECHNOLOGIST PROVIDED HISTORY: chest pain with slight elevation in WBC FINDINGS: Cardiac silhouette is normal in size. Lungs are mildly hyperinflated. Linear atelectasis/scarring at the left lung base. No definite focal airspace consolidation, sizeable pleural effusion or pneumothorax. Status post lower cervical spine fusion. Linear atelectasis/scarring at the left lung base. Otherwise, no convincing evidence for acute cardiopulmonary pathology. Physical Exam:    General appearance - NAD, AOx 3   Lungs -CTAB, no R/R/R  Heart - RRR, no M/R/G  Abdomen - Soft, NT/ND  Neurological:  MAEx4, No focal motor deficit, sensory loss  Extremities - Cap refil <2 sec in all ext., no edema  Skin -warm, dry      Hospital Course:  Clinical course has improved, labs and imaging reviewed. Tomer Craven originally presented to the hospital on 4/27/2021  8:45 AM with acute chest pain. Initial work-up in the emergency room with EKG and troponin within normal limits however given patient recent assess that showed intermediate risk and heart score of 5, cardiology was consulted. At that time it was determined that He required further observation and cardiology evaluation. They recommended for patient to go to Cath Lab.   Cardiac catheterization demonstrates mild CAD, no further recommendation by cardiology and continue with home medication. Pain resolved with pain medication. Patient to tolerate orals without any nausea or vomiting. Is follow-up with [place] [facility]. Labs and imaging were followed daily. Imaging results as above. He is medically stable to be discharged. Disposition: Home    Patient stated that they will not drive themselves home from the hospital if they have gotten pain killers/ narcotics earlier that day and that they will arrange for transportation on their own or work with the  for a ride. Patient counseled NOT to drive while under the influence of narcotics/ pain killers. Condition: Good    Patient stable and ready for discharge home. I have discussed plan of care with patient and they are in understanding. They were instructed to read discharge paperwork. All of their questions and concerns were addressed. Time Spent: 0 day      --  Loida Jordan MD  Emergency Medicine Resident Physician    This dictation was generated by voice recognition computer software. Although all attempts are made to edit the dictation for accuracy, there may be errors in the transcription that are not intended.

## 2021-04-28 NOTE — PROGRESS NOTES
OBS/CDU   RESIDENT NOTE      Patients PCP is:  Ruperto Perez, RENETTA - EL        SUBJECTIVE      No acute events overnight. Patient n.p.o.  Still complaining of chest pain. The patient is urinating on his own and is passing flatus. Denies fever, chills, nausea, vomiting, shortness of breath, abdominal pain, focal weakness, numbness, tingling, urinary/bowel symptoms, or headache. PHYSICAL EXAM      General: NAD, AO X 3  Heent: EMOI, PERRL  Neck: SUPPLE, NO JVD  Cardiovascular: RRR, S1S2  Pulmonary: CTAB, NO SOB  Abdomen: SOFT, NTTP, ND, +BS  Extremities: +2/4 PULSES DISTAL, NO SWELLING  Neuro / Psych: NO NUMBNESS OR TINGLING, MENTATION AT BASELINE    PERTINENT TEST /EXAMS      I have reviewed all available laboratory results. MEDICATIONS CURRENT   benzonatate (TESSALON) capsule 100 mg, TID PRN  ALPRAZolam (XANAX) tablet 0.5 mg, TID PRN  losartan (COZAAR) tablet 50 mg, Daily  sodium chloride flush 0.9 % injection 5-40 mL, 2 times per day  sodium chloride flush 0.9 % injection 5-40 mL, PRN  0.9 % sodium chloride infusion, PRN  enoxaparin (LOVENOX) injection 40 mg, Daily  acetaminophen (TYLENOL) tablet 650 mg, Q4H PRN  tiZANidine (ZANAFLEX) tablet 2 mg, Q6H PRN  ibuprofen (ADVIL;MOTRIN) tablet 800 mg, Q6H PRN  aspirin chewable tablet 81 mg, Daily  atorvastatin (LIPITOR) tablet 40 mg, Nightly  gabapentin (NEURONTIN) capsule 100 mg, TID  methadone 5 MG/5ML solution 135 mg, Daily        All medication charted and reviewed. CONSULTS      IP CONSULT TO CARDIOLOGY    ASSESSMENT/PLAN       Oliva Javier is a 45 y.o. male who presents with acute chest pain. Initial cardiac work-up in the ED including EKG and troponin was unremarkable.   Patient does state that he have a stress test on 4/21/2021 with intermediate risk result no stress-induced ischemia, LVEF 47%    · Awaiting cardiology recommendation  · CXR was not obtained on admission and given pt complaints of chest pain and slight elevation in WBC without patient being given steroids, will obtain portable chest xray  · Continue home medications and pain control  · Monitor vitals, labs, and imaging  · DISPO: pending consults and clinical improvement    --  [facility]  Emergency Medicine Resident Physician     This dictation was generated by voice recognition computer software. Although all attempts are made to edit the dictation for accuracy, there may be errors in the transcription that are not intended.

## 2021-04-28 NOTE — ED NOTES
Pt is resting on stretcher in NAD with all vitals stable. Pt denies pain. Pt urinal emptied. Pt requesting glass of water. Pt is on stretcher a&o x4 with both side rials up and call light within reach.       Bonita Nelson RN  04/27/21 2109

## 2021-04-28 NOTE — PROGRESS NOTES
[facility]  Progress Note                   Date:   4/28/2021  Patient name: Vi Joyce  Date of admission:  4/27/2021  8:45 AM  MRN:   [id number]  YOB: 1970  PCP: RENETTA Reed - CNP    Reason for Admission: Chest pain [R07.9]    Subjective:       Clinical Changes /Abnormalities: Pt. Seen & examined in room with family member at bedside. Denies any SOB. States he is having some left sided chest pressure still. States it goes away [place] little\" but never completely resolves. Labs, vitals, & tele reviewed. Review of Systems    Medications:   Scheduled Meds:   losartan  50 mg Oral Daily    sodium chloride flush  5-40 mL Intravenous 2 times per day    enoxaparin  40 mg Subcutaneous Daily    aspirin  81 mg Oral Daily    atorvastatin  40 mg Oral Nightly    gabapentin  100 mg Oral TID    methadone  135 mg Oral Daily     Continuous Infusions:   sodium chloride       CBC:   Recent Labs     04/27/21  0913 04/28/21  0604   WBC 7.8 11.6*   HGB 14.3 14.4    340     BMP:    Recent Labs     04/27/21  0913 04/28/21  0604    139   K 4.3 4.3    101   CO2 27 26   BUN 6 10   CREATININE 0.71 0.73   GLUCOSE 116* 98     Hepatic:  Recent Labs     04/27/21  0913   AST 51*   ALT 48*   BILITOT 0.24*   ALKPHOS 94     Troponin:   Recent Labs     04/27/21  1002 04/27/21  1247 04/27/21  2035   TROPHS 8 26* <6     BNP: No results for input(s): BNP in the last 72 hours. Lipids: No results for input(s): CHOL, HDL in the last 72 hours. Invalid input(s): LDLCALCU  INR: No results for input(s): INR in the last 72 hours. Objective:   Vitals: /85   Pulse 60   Temp 98.5 °F (36.9 °C) (Oral)   Resp 16   Ht 6' (1.829 m)   Wt 180 lb (81.6 kg)   SpO2 95%   BMI 24.41 kg/m²   General appearance: alert and cooperative with exam  HEENT: Head: Normocephalic, no lesions, without obvious abnormality.   Neck:no JVD, trachea midline, no adenopathy  Lungs: Clear to auscultation  Heart: Regular rate and rhythm, s1/s2 auscultated, no murmurs  Abdomen: soft, non-tender, bowel sounds active  Extremities: no edema  Neurologic: not done    Stress Test 4/12/21  Impression       Diaphragmatic attenuation artifact.       No stress-induced ischemia.       No infarct.       LVEF 47%. Echo 4/12/21  Summary  Normal left ventricle size, wall thickness and function with an estimated EF  > 55%. Aortic valve is sclerotic but opens well. Thickened mitral valve leaflets. Trivial tricuspid regurgitation. Mild pulmonary hypertension. Estimated right ventricular systolic pressure  is [id number]. IVC Increased diameter, but still has inspiratory variation suggesting upper  normal or mildly elevated RA filling pressure (i.e. CVP) . Assessment / Acute Cardiac Problems:   1. Angina  2. Bradycardia- resolved  3. Mild PHTN    Patient Active Problem List:     Bradycardia     Chest pain      Plan of Treatment:   1. Stress and echo reveiwed. Continued symtpoms both typical and atypical. Plan for cardiac cath today as per Dr. Adi Sol recommendations. 2. I have discussed risks (including but not limited to vascular injury, infection, hematoma, contrast induced kidney dysfunction, CVA and MI), benefits, alternatives in detail. All questions answered. Patient agrees to proceed.       Electronically signed by RENETTA Madera CNP on 4/28/2021 at 9:58 AM  [address]  [phone]

## 2021-04-28 NOTE — PROGRESS NOTES
[facility]  CDU / OBSERVATION ENCOUNTER  ATTENDING NOTE       I performed a history and physical examination of the patient and discussed management with the resident or midlevel provider. I reviewed the resident or midlevel provider's note and agree with the documented findings and plan of care. Any areas of disagreement are noted on the chart. I was personally present for the key portions of any procedures. I have documented in the chart those procedures where I was not present during the key portions. I have reviewed the nurses notes. I agree with the chief complaint, past medical history, past surgical history, allergies, medications, social and family history as documented unless otherwise noted below. The Family history, social history, and ROS are effectively unchanged since admission unless noted elsewhere in the chart. Patient with cardiac catheterization today. Resolution of chest pain. Patient is felt safe for discharge after catheterization. Patient for follow-up as outpatient. Bradycardia from last week resolved. Patient with ongoing follow-up with urology.     Jasmina Gaytan MD  Attending Emergency  Physician

## 2021-04-28 NOTE — PROGRESS NOTES
[facility]  CDU / OBSERVATION ENCOUNTER  ATTENDING NOTE       I discussed the case with the midlevel provider who has documented a note on this patient. We agreed on management and treatment plan.       Terrance Tolbert MD  Attending Emergency  Physician

## 2021-04-28 NOTE — OP NOTE
[facility]        Date:   4/28/2021  Patient name:  Jovanny Fairchild  Date of admission:  4/27/2021  8:45 AM  MRN:   [id number]  YOB: 1970    CARDIAC CATHETERIZATION    Operators:  Primary:Indy Cross MD    CV Fellow: Marixa Truong M.D. Procedure performed:       [x] Left Heart Catheterization. [] Graft Angiography.  [] Left Ventriculography. [] Right Heart Catheterization. [x] Coronary Angiography. [] Aortic Valve Studies. [] PCI:      [] Other:       Pre Procedure Conscious Sedation Data:    ASA Class:    [] I [] II [x] III [] IV    Mallampati Class:  [] I [] II [x] III [] IV      Indication:  [] STEMI      [] + Stress test  [] ACS      [] + EKG Changes  [] Non Q MI       [] Significant Risk Factors  [x] Recurrent Angina             [] Diabetes Mellitus    [] New LBBB      [] Uncontrolled HTN. [] CHF / Low EF changes     [] Abnormal CTA / Ca Score  [] Other:     Procedure:  Access:  [x] Femoral artery  [] Radial  artery       [x] Right   [] Left    Procedure: After informed consent was obtained with explanation of the risks and benefits, patient was brought to the cath lab. The access area was prepped and draped in sterile fashion. 1% lidocaine was used for local block. The artery was cannulated with 6  Fr sheath with brisk arterial blood return. The side port was frequently flushed and aspirated with normal saline. Estimated blood loss: 10 ml    Findings:       LMCA: Mild irregularities 10-20%.     LAD: Mild irregularities 10-20%.     LCx: Mild irregularities 10-20%.     RCA: Mild irregularities 10-20%. Small nondominant      Coronary Tree      Dominance: Left      Conclusions:  Mild CAD. Recommendations:  Medical therapy as needed.       History and Risk Factors    [x] Hypertension     [] Family history of CAD  [] Hyperlipidemia     [] Cerebrovascular Disease   [] Prior MI       [] Peripheral Vascular disease   [] Prior PCI              [] Diabetes Mellitus [] Left Main PCI. [] Currently on Dialysis. [] Prior CABG. [] Currently smoker. [] Cardiac Arrest outside of healthcare facility. [] Yes    [x] No        Witnessed     [] Yes   [] No     Arrest after arrival of EMS  [] Yes   [] No     [] Cardiac Arrest at other Facility. [] Yes   [] No    Pre-Procedure Information. Heart Failure       [] Yes    [x] No        Class  [] I      [] II  [] III    [] IV. New Diagnosis    [] Yes  [] No    HF Type      [] Systolic   [] Diastolic          [] Unknown. Diagnostic Test:   EKG       [x] Normal   [] Abnormal    New antiarrhythmia medications:    [] Yes   [] No   New onset atrial fibrillation / Flutter     [] Yes   [] No   ECG Abnormalities:      [] V. Fib   [] Aileen V. Tach           [] NS V. T   [] New LBBB           [] T. Inv  []  ST dev > 0.5 mm         [] PVC's freq  [] PVC's infrequent    Stress Test Performed:      [x] Yes    [] No     Type:     [] Stress Echo   [] Exercise Stress Test (no imaging)      [x] Stress Nuclear  [] Stress Imaging     Results   [x] Negative   [] Positive        [] Indeterminate  [] Unavailable     If Positive/ Risk / Extent of Ischemia:       [] Low  [] Intermediate         [] High  [] Unavailable      Cardiac CTA Performed:     [] Yes    [x] No      Results   [] CAD   [] Non obstructive CAD      [] No CAD   [] Uncertain      [] Unknown   [] Structural Disease. Pre Procedure Medications:   [x] Yes    [] No         [x] ASA  [] Beta Blockers      [] Nitrate  [] Ca Channel Blockers      [] Ranolazine  [x] Statin       [] Plavix/Others antiplatelets          Heather Villegas MD  Fellow, [address]  Sophy Barr MD, Attending Physician  [facility]

## 2021-04-29 LAB
EKG ATRIAL RATE: 59 BPM
EKG P AXIS: 73 DEGREES
EKG P-R INTERVAL: 126 MS
EKG Q-T INTERVAL: 494 MS
EKG QRS DURATION: 86 MS
EKG QTC CALCULATION (BAZETT): 489 MS
EKG R AXIS: 64 DEGREES
EKG T AXIS: 59 DEGREES
EKG VENTRICULAR RATE: 59 BPM

## 2021-04-29 PROCEDURE — 93010 ELECTROCARDIOGRAM REPORT: CPT | Performed by: INTERNAL MEDICINE

## 2021-05-20 NOTE — ED NOTES
Meal tray ordered for patient.      Jose Rafael Richardson RN  04/27/21 6043 Rehabilitation services/Wound care and assessment

## 2021-06-06 ENCOUNTER — APPOINTMENT (OUTPATIENT)
Dept: GENERAL RADIOLOGY | Age: 51
End: 2021-06-06
Payer: COMMERCIAL

## 2021-06-06 ENCOUNTER — HOSPITAL ENCOUNTER (EMERGENCY)
Age: 51
Discharge: HOME | End: 2021-06-06
Attending: EMERGENCY MEDICINE
Payer: COMMERCIAL

## 2021-06-06 VITALS
OXYGEN SATURATION: 100 % | TEMPERATURE: 97.1 F | RESPIRATION RATE: 16 BRPM | DIASTOLIC BLOOD PRESSURE: 96 MMHG | HEART RATE: 106 BPM | SYSTOLIC BLOOD PRESSURE: 157 MMHG

## 2021-06-06 DIAGNOSIS — R07.9 CHEST PAIN, UNSPECIFIED TYPE: Primary | ICD-10-CM

## 2021-06-06 LAB
ABSOLUTE EOS #: 0.06 K/UL (ref 0–0.44)
ABSOLUTE IMMATURE GRANULOCYTE: 0.03 K/UL (ref 0–0.3)
ABSOLUTE LYMPH #: 2.82 K/UL (ref 1.1–3.7)
ABSOLUTE MONO #: 0.65 K/UL (ref 0.1–1.2)
ANION GAP SERPL CALCULATED.3IONS-SCNC: 11 MMOL/L (ref 9–17)
BASOPHILS # BLD: 0 % (ref 0–2)
BASOPHILS ABSOLUTE: 0.03 K/UL (ref 0–0.2)
BUN BLDV-MCNC: 6 MG/DL (ref 6–20)
BUN/CREAT BLD: ABNORMAL (ref 9–20)
CALCIUM SERPL-MCNC: 9.4 MG/DL (ref 8.6–10.4)
CHLORIDE BLD-SCNC: 101 MMOL/L (ref 98–107)
CO2: 26 MMOL/L (ref 20–31)
CREAT SERPL-MCNC: 0.81 MG/DL (ref 0.7–1.2)
DIFFERENTIAL TYPE: NORMAL
EOSINOPHILS RELATIVE PERCENT: 1 % (ref 1–4)
GFR AFRICAN AMERICAN: >60 ML/MIN
GFR NON-AFRICAN AMERICAN: >60 ML/MIN
GFR SERPL CREATININE-BSD FRML MDRD: ABNORMAL ML/MIN/{1.73_M2}
GFR SERPL CREATININE-BSD FRML MDRD: ABNORMAL ML/MIN/{1.73_M2}
GLUCOSE BLD-MCNC: 131 MG/DL (ref 70–99)
HCT VFR BLD CALC: 41.7 % (ref 40.7–50.3)
HEMOGLOBIN: 13.8 G/DL (ref 13–17)
IMMATURE GRANULOCYTES: 0 %
LYMPHOCYTES # BLD: 36 % (ref 24–43)
MCH RBC QN AUTO: 30.6 PG (ref 25.2–33.5)
MCHC RBC AUTO-ENTMCNC: 33.1 G/DL (ref 28.4–34.8)
MCV RBC AUTO: 92.5 FL (ref 82.6–102.9)
MONOCYTES # BLD: 8 % (ref 3–12)
NRBC AUTOMATED: 0 PER 100 WBC
PDW BLD-RTO: 12.6 % (ref 11.8–14.4)
PLATELET # BLD: 368 K/UL (ref 138–453)
PLATELET ESTIMATE: NORMAL
PMV BLD AUTO: 9.7 FL (ref 8.1–13.5)
POTASSIUM SERPL-SCNC: 3.8 MMOL/L (ref 3.7–5.3)
RBC # BLD: 4.51 M/UL (ref 4.21–5.77)
RBC # BLD: NORMAL 10*6/UL
SEG NEUTROPHILS: 55 % (ref 36–65)
SEGMENTED NEUTROPHILS ABSOLUTE COUNT: 4.17 K/UL (ref 1.5–8.1)
SODIUM BLD-SCNC: 138 MMOL/L (ref 135–144)
TROPONIN INTERP: NORMAL
TROPONIN INTERP: NORMAL
TROPONIN T: NORMAL NG/ML
TROPONIN T: NORMAL NG/ML
TROPONIN, HIGH SENSITIVITY: 7 NG/L (ref 0–22)
TROPONIN, HIGH SENSITIVITY: <6 NG/L (ref 0–22)
WBC # BLD: 7.8 K/UL (ref 3.5–11.3)
WBC # BLD: NORMAL 10*3/UL

## 2021-06-06 PROCEDURE — 80048 BASIC METABOLIC PNL TOTAL CA: CPT

## 2021-06-06 PROCEDURE — 96374 THER/PROPH/DIAG INJ IV PUSH: CPT

## 2021-06-06 PROCEDURE — 2580000003 HC RX 258: Performed by: STUDENT IN AN ORGANIZED HEALTH CARE EDUCATION/TRAINING PROGRAM

## 2021-06-06 PROCEDURE — 85025 COMPLETE CBC W/AUTO DIFF WBC: CPT

## 2021-06-06 PROCEDURE — 6370000000 HC RX 637 (ALT 250 FOR IP): Performed by: STUDENT IN AN ORGANIZED HEALTH CARE EDUCATION/TRAINING PROGRAM

## 2021-06-06 PROCEDURE — 93005 ELECTROCARDIOGRAM TRACING: CPT | Performed by: STUDENT IN AN ORGANIZED HEALTH CARE EDUCATION/TRAINING PROGRAM

## 2021-06-06 PROCEDURE — 71046 X-RAY EXAM CHEST 2 VIEWS: CPT

## 2021-06-06 PROCEDURE — 96375 TX/PRO/DX INJ NEW DRUG ADDON: CPT

## 2021-06-06 PROCEDURE — 99285 EMERGENCY DEPT VISIT HI MDM: CPT

## 2021-06-06 PROCEDURE — 84484 ASSAY OF TROPONIN QUANT: CPT

## 2021-06-06 PROCEDURE — 6360000002 HC RX W HCPCS: Performed by: STUDENT IN AN ORGANIZED HEALTH CARE EDUCATION/TRAINING PROGRAM

## 2021-06-06 RX ORDER — AMLODIPINE BESYLATE 2.5 MG/1
2.5 TABLET ORAL DAILY
COMMUNITY

## 2021-06-06 RX ORDER — TAMSULOSIN HYDROCHLORIDE 0.4 MG/1
0.4 CAPSULE ORAL DAILY
COMMUNITY

## 2021-06-06 RX ORDER — TIOTROPIUM BROMIDE INHALATION SPRAY 1.56 UG/1
2 SPRAY, METERED RESPIRATORY (INHALATION) DAILY
COMMUNITY

## 2021-06-06 RX ORDER — ONDANSETRON 2 MG/ML
4 INJECTION INTRAMUSCULAR; INTRAVENOUS ONCE
Status: COMPLETED | OUTPATIENT
Start: 2021-06-06 | End: 2021-06-06

## 2021-06-06 RX ORDER — ASPIRIN 81 MG/1
324 TABLET, CHEWABLE ORAL ONCE
Status: COMPLETED | OUTPATIENT
Start: 2021-06-06 | End: 2021-06-06

## 2021-06-06 RX ORDER — NITROGLYCERIN 0.4 MG/1
0.4 TABLET SUBLINGUAL ONCE
Status: COMPLETED | OUTPATIENT
Start: 2021-06-06 | End: 2021-06-06

## 2021-06-06 RX ORDER — 0.9 % SODIUM CHLORIDE 0.9 %
1000 INTRAVENOUS SOLUTION INTRAVENOUS ONCE
Status: COMPLETED | OUTPATIENT
Start: 2021-06-06 | End: 2021-06-06

## 2021-06-06 RX ORDER — SERTRALINE HYDROCHLORIDE 100 MG/1
200 TABLET, FILM COATED ORAL DAILY
COMMUNITY
End: 2022-05-17

## 2021-06-06 RX ORDER — MORPHINE SULFATE 4 MG/ML
4 INJECTION, SOLUTION INTRAMUSCULAR; INTRAVENOUS ONCE
Status: COMPLETED | OUTPATIENT
Start: 2021-06-06 | End: 2021-06-06

## 2021-06-06 RX ORDER — DIVALPROEX SODIUM 500 MG/1
500 TABLET, DELAYED RELEASE ORAL 3 TIMES DAILY
COMMUNITY

## 2021-06-06 RX ADMIN — ASPIRIN 324 MG: 81 TABLET, CHEWABLE ORAL at 13:42

## 2021-06-06 RX ADMIN — SODIUM CHLORIDE 1000 ML: 9 INJECTION, SOLUTION INTRAVENOUS at 13:43

## 2021-06-06 RX ADMIN — MORPHINE SULFATE 4 MG: 4 INJECTION INTRAVENOUS at 13:43

## 2021-06-06 RX ADMIN — NITROGLYCERIN 0.4 MG: 0.4 TABLET SUBLINGUAL at 13:43

## 2021-06-06 RX ADMIN — ONDANSETRON 4 MG: 2 INJECTION INTRAMUSCULAR; INTRAVENOUS at 13:43

## 2021-06-06 ASSESSMENT — PAIN SCALES - GENERAL
PAINLEVEL_OUTOF10: 10
PAINLEVEL_OUTOF10: 10

## 2021-06-06 ASSESSMENT — PAIN DESCRIPTION - PAIN TYPE: TYPE: ACUTE PAIN

## 2021-06-06 ASSESSMENT — PAIN DESCRIPTION - ORIENTATION: ORIENTATION: LEFT

## 2021-06-06 ASSESSMENT — PAIN DESCRIPTION - LOCATION: LOCATION: CHEST

## 2021-06-06 ASSESSMENT — HEART SCORE: ECG: 1

## 2021-06-06 NOTE — ED PROVIDER NOTES
Types: Marijuana     Comment: daily    Sexual activity: Not on file   Other Topics Concern    Not on file   Social History Narrative    Not on file     Social Determinants of Health     Financial Resource Strain:     Difficulty of Paying Living Expenses:    Food Insecurity:     Worried About Running Out of Food in the Last Year:     [address] in the Last Year:    Transportation Needs:     Lack of Transportation (Medical):  Lack of Transportation (Non-Medical):    Physical Activity:     Days of Exercise per Week:     Minutes of Exercise per Session:    Stress:     Feeling of Stress :    Social Connections:     Frequency of Communication with Friends and Family:     Frequency of Social Gatherings with Friends and Family:     Attends [employer] Services:     Active Member of Clubs or Organizations:     Attends Club or Organization Meetings:     Marital Status:    Intimate Partner Violence:     Fear of Current or Ex-Partner:     Emotionally Abused:     Physically Abused:     Sexually Abused:        History reviewed. No pertinent family history. Allergies:  Codeine, Pcn [penicillins], and Sulfa antibiotics    Home Medications:  Prior to Admission medications    Medication Sig Start Date End Date Taking?  Authorizing Provider   divalproex (DEPAKOTE) 500 MG DR tablet Take 500 mg by mouth 3 times daily   Yes Historical Provider, MD   sertraline (ZOLOFT) 100 MG tablet Take 200 mg by mouth daily   Yes Historical Provider, MD   tamsulosin (FLOMAX) 0.4 MG capsule Take 0.4 mg by mouth daily   Yes Historical Provider, MD   amLODIPine (NORVASC) 2.5 MG tablet Take 2.5 mg by mouth daily   Yes Historical Provider, MD   tiotropium (SPIRIVA RESPIMAT) 1.25 MCG/ACT AERS inhaler Inhale 2 puffs into the lungs daily   Yes Historical Provider, MD   losartan (COZAAR) 50 MG tablet Take 50 mg by mouth daily 4/22/21   Historical Provider, MD   gabapentin (NEURONTIN) 100 MG capsule Take 100 mg by mouth 3 times daily.    Historical Provider, MD   benzonatate (TESSALON) 100 MG capsule Take 100 mg by mouth 3 times daily as needed for Cough    Historical Provider, MD   guanFACINE HCl (TENEX PO) Take 1 mg by mouth    Historical Provider, MD   Ibuprofen (MOTRIN PO) Take by mouth    Historical Provider, MD   methadone 5 MG/5ML solution Take 135 mg by mouth daily. Takes at 0700 am 4/12/21   Historical Provider, MD   acetaminophen (TYLENOL) 325 MG tablet Take by mouth every 6 hours as needed for Pain    Historical Provider, MD       REVIEW OF SYSTEMS    (2-9 systems for level 4, 10 or more for level 5)      General ROS - No fevers, No chills, no gradual weight loss, no night sweats  Ophthalmic ROS - No discharge, No changes in vision  ENT ROS -  No sore throat, No rhinorrhea,   Respiratory ROS - no shortness of breath, no cough, no  wheezing  Cardiovascular ROS - positive chest pain, no dyspnea on exertion  Gastrointestinal ROS - No abdominal pain, no nausea, no vomiting, no change in bowel habits, no black or bloody stools  Genito-Urinary ROS - No dysuria, trouble voiding, or hematuria  Musculoskeletal ROS - No myalgias, No arthalgias  Neurological ROS - No headache, no dizziness/lightheadedness, No focal weakness, no loss of sensation  Dermatological ROS - No lesions, No rash         PHYSICAL EXAM   (up to 7 for level 4, 8 or more for level 5)      INITIAL VITALS:   BP (!) 157/96   Pulse 106   Temp 97.1 °F (36.2 °C) (Oral)   Resp 16   SpO2 100%     General Appearance: Well-appearing, in no acute distress  HEENT: Head: normocephalic/atraumatic eyes: PERRLA, EOMT, conjunctiva not injected, sclerae nonicteric ears: External canals patent nose: Nares patent, no rhinorrhea, throat:mucous membranes moist, oropharynx clear     Neck: Trachea midline, no JVD. Lungs: No evidence of increased work of breathing. CTA B/L, no wheezes/rhonchi     Cardiovascular: RRR, no murmur, 2+ peripheral pulses bilaterally.   Cap refill less than 2 Mono # 0.65 0.10 - 1.20 k/uL    Absolute Eos # 0.06 0.00 - 0.44 k/uL    Basophils Absolute 0.03 0.00 - 0.20 k/uL    Absolute Immature Granulocyte 0.03 0.00 - 0.30 k/uL    WBC Morphology NOT REPORTED     RBC Morphology NOT REPORTED     Platelet Estimate NOT REPORTED    BASIC METABOLIC PANEL   Result Value Ref Range    Glucose 131 (H) 70 - 99 mg/dL    BUN 6 6 - 20 mg/dL    CREATININE 0.81 0.70 - 1.20 mg/dL    Bun/Cre Ratio NOT REPORTED 9 - 20    Calcium 9.4 8.6 - 10.4 mg/dL    Sodium 138 135 - 144 mmol/L    Potassium 3.8 3.7 - 5.3 mmol/L    Chloride 101 98 - 107 mmol/L    CO2 26 20 - 31 mmol/L    Anion Gap 11 9 - 17 mmol/L    GFR Non-African American >60 >60 mL/min    GFR African American >60 >60 mL/min    GFR Comment          GFR Staging NOT REPORTED            RADIOLOGY:  No results found.     EKG  None    All EKG's are interpreted by the Emergency Department Physician who either signs or Co-signs this chart in the absence of a cardiologist.    EMERGENCY DEPARTMENT COURSE/IMPRESSION:    Atypical chest pain nonexertional 3 days, he does have new EKG changes concerning otherwise he does have risk factors include hypertension hyperlipidemia smoker recent cardiac work-up including cardiac cath 1 month ago showing no significant abnormalities with a normal echocardiogram    Plan is cardiac work-up low concern for pulmonary embolism or dissection based on history exam findings will give aspirin nitro dispo pending work-up and wrist defecation    ED Course as of Jun 06 1508   Sun Jun 06, 2021   1314 Pulse: 106 [EF]   1321 EKG shows sinus rhythm rate 111 normal axis , there is ST depressions in leads II, 3, V2 through V5, no ST elevations or T wave abnormalities noted    [EF]   1342 Bedside ultrasound shows global adequate heart squeeze, no enlarged right ventricle negative D sign is no pericardial effusion    [EF]   1425 Troponin, High Sensitivity: <6 [EF]   1502 Reproducible musculoskeletal pain discussed cardiac work-up with 2 - troponins patient understands agrees with plan to see his family doctor and to arrange cardiology visit within a week outpatient he already has a cardiologist patient and wife agree with plan I gave return precautions for any worsening signs or symptoms to return the ER    [EF]      ED Course User Index  [EF] Jeffrey Carr DO     Heart Score    Heart Score for chest pain patients  History: Slightly Suspicious  ECG: Non-Specifc repolarization disturbance/LBTB/PM  Patient Age: > 39 and < 65 years  *Risk factors for Atherosclerotic disease: Cigarette smoking, Hypertension, Hypercholesterolemia  Risk Factors: > 3 Risk factors or history of atherosclerotic disease*  Troponin: < 1X normal limit  Heart Score Total: 4    Score 0 - 3 = 2.5%  MACE over next 6 wks = Discharge home  Score 4 - 6 = 20.3%  MACE over next 6 wks = Obs admit  Score 7 - 10 = 72.7%  MACE over next 6 wks = Early invasive Rx    PROCEDURES:  None    CONSULTS:  None    CRITICAL CARE:  None    FINAL IMPRESSION      1. Chest pain, unspecified type          DISPOSITION / PLAN     DISPOSITION Decision To Discharge 06/06/2021 03:02:42 PM      PATIENT REFERRED TO:  Iza Plata, RENETTA - CNP  [address] [facility] [address] [phone]    Schedule an appointment as soon as possible for a visit in 2 days  Recheck of your chest pain and for further care and referral to cardiologist    [facility]  [address] [place] [address]  [phone]  Go to   If symptoms worsen      DISCHARGE MEDICATIONS:  New Prescriptions    No medications on file       DO Ani Monzon D.O.   Emergency Medicine Resident    (Please note that portions of this note were completed with a voice recognition program.  Efforts were made to edit the dictations but occasionally words aremis-transcribed.)       Jeffrey Carr DO  Resident  06/06/21 0293

## 2021-06-06 NOTE — ED PROVIDER NOTES
[address] [facility]     Emergency Department     Faculty Attestation    I performed a history and physical examination of the patient and discussed management with the resident. I have reviewed and agree with the residents findings including all diagnostic interpretations, and treatment plans as written at the time of my review. Any areas of disagreement are noted on the chart. I was personally present for the key portions of any procedures. I have documented in the chart those procedures where I was not present during the key portions. For Physician Assistant/ Nurse Practitioner cases/documentation I have personally evaluated this patient and have completed at least one if not all key elements of the E/M (history, physical exam, and MDM). Additional findings are as noted. This patient was evaluated in the Emergency Department for symptoms described in the history of present illness. The patient was evaluated in the context of the global COVID-19 pandemic, which necessitated consideration that the patient might be at risk for infection with the SARS-CoV-2 virus that causes COVID-19. Institutional protocols and algorithms that pertain to the evaluation of patients at risk for COVID-19 are in a state of rapid change based on information released by regulatory bodies including the CDC and federal and state organizations. These policies and algorithms were followed during the patient's care in the ED. Primary Care Physician: Eliot Crenshaw, RENETTA - CNP    History: This is a 45 y.o. male who presents to the Emergency Department with complaint of chest pain. The patient presents emergency room complaining of left-sided chest pain is been present for the last 3 days. Denies any shortness of breath but has been having some nausea vomiting. He denies any diarrhea. Patient states the pain is worse with deep inspiration or movement.   He says is better when he holds his left side of his chest.  The patient denies any recent long travel or pain is full in his legs. Physical:   oral temperature is 97.1 °F (36.2 °C). His blood pressure is 157/96 (abnormal) and his pulse is 106. His respiration is 16 and oxygen saturation is 100%. Lungs are clear to auscultation bilateral, heart mildly tachycardic with a regular rhythm, abdomen is soft nontender    Impression: Chest pain    Plan: Chest x-ray, EKG, CBC, BMP, troponin      EKG Interpretation    Interpreted by me  Sinus tachycardia ventricular 111, normal MD interval, normal QRS duration, normal axis,      (Please note that portions of this note were completed with a voice recognition program.  Efforts were made to edit the dictations but occasionally words are mis-transcribed.)    Beatriz Hernandez.  Charlet Carrel, MD, [address] [facility],[address]  Attending Emergency Medicine Physician        Jaci Huang MD  06/06/21 [id number]

## 2021-06-07 LAB
EKG ATRIAL RATE: 111 BPM
EKG ATRIAL RATE: 66 BPM
EKG P AXIS: 73 DEGREES
EKG P AXIS: 77 DEGREES
EKG P-R INTERVAL: 130 MS
EKG P-R INTERVAL: 130 MS
EKG Q-T INTERVAL: 352 MS
EKG Q-T INTERVAL: 448 MS
EKG QRS DURATION: 84 MS
EKG QRS DURATION: 90 MS
EKG QTC CALCULATION (BAZETT): 469 MS
EKG QTC CALCULATION (BAZETT): 478 MS
EKG R AXIS: 60 DEGREES
EKG R AXIS: 64 DEGREES
EKG T AXIS: 51 DEGREES
EKG T AXIS: 56 DEGREES
EKG VENTRICULAR RATE: 111 BPM
EKG VENTRICULAR RATE: 66 BPM

## 2021-06-07 PROCEDURE — 93010 ELECTROCARDIOGRAM REPORT: CPT | Performed by: INTERNAL MEDICINE

## 2021-12-05 ENCOUNTER — HOSPITAL ENCOUNTER (EMERGENCY)
Age: 51
Discharge: HOME | End: 2021-12-05
Attending: EMERGENCY MEDICINE
Payer: COMMERCIAL

## 2021-12-05 VITALS
OXYGEN SATURATION: 98 % | TEMPERATURE: 99.3 F | RESPIRATION RATE: 16 BRPM | HEIGHT: 72 IN | SYSTOLIC BLOOD PRESSURE: 156 MMHG | DIASTOLIC BLOOD PRESSURE: 84 MMHG | WEIGHT: 170 LBS | BODY MASS INDEX: 23.03 KG/M2 | HEART RATE: 64 BPM

## 2021-12-05 DIAGNOSIS — K04.7 DENTAL INFECTION: Primary | ICD-10-CM

## 2021-12-05 PROCEDURE — 93005 ELECTROCARDIOGRAM TRACING: CPT | Performed by: EMERGENCY MEDICINE

## 2021-12-05 PROCEDURE — 99283 EMERGENCY DEPT VISIT LOW MDM: CPT

## 2021-12-05 PROCEDURE — 2500000003 HC RX 250 WO HCPCS: Performed by: EMERGENCY MEDICINE

## 2021-12-05 PROCEDURE — 64400 NJX AA&/STRD TRIGEMINAL NRV: CPT

## 2021-12-05 PROCEDURE — 6370000000 HC RX 637 (ALT 250 FOR IP): Performed by: EMERGENCY MEDICINE

## 2021-12-05 PROCEDURE — 6360000002 HC RX W HCPCS: Performed by: EMERGENCY MEDICINE

## 2021-12-05 PROCEDURE — 96372 THER/PROPH/DIAG INJ SC/IM: CPT

## 2021-12-05 RX ORDER — IBUPROFEN 800 MG/1
800 TABLET ORAL EVERY 6 HOURS PRN
Qty: 21 TABLET | Refills: 0 | Status: ON HOLD | OUTPATIENT
Start: 2021-12-05 | End: 2022-05-17

## 2021-12-05 RX ORDER — ACETAMINOPHEN 500 MG
1000 TABLET ORAL 3 TIMES DAILY
Qty: 60 TABLET | Refills: 0 | Status: SHIPPED | OUTPATIENT
Start: 2021-12-05 | End: 2021-12-15

## 2021-12-05 RX ORDER — CLINDAMYCIN HYDROCHLORIDE 150 MG/1
450 CAPSULE ORAL 3 TIMES DAILY
Qty: 63 CAPSULE | Refills: 0 | Status: SHIPPED | OUTPATIENT
Start: 2021-12-05 | End: 2021-12-12

## 2021-12-05 RX ORDER — CLINDAMYCIN HYDROCHLORIDE 150 MG/1
450 CAPSULE ORAL ONCE
Status: COMPLETED | OUTPATIENT
Start: 2021-12-05 | End: 2021-12-05

## 2021-12-05 RX ORDER — MAGNESIUM HYDROXIDE/ALUMINUM HYDROXICE/SIMETHICONE 120; 1200; 1200 MG/30ML; MG/30ML; MG/30ML
30 SUSPENSION ORAL ONCE
Status: COMPLETED | OUTPATIENT
Start: 2021-12-05 | End: 2021-12-05

## 2021-12-05 RX ORDER — KETOROLAC TROMETHAMINE 30 MG/ML
30 INJECTION, SOLUTION INTRAMUSCULAR; INTRAVENOUS ONCE
Status: COMPLETED | OUTPATIENT
Start: 2021-12-05 | End: 2021-12-05

## 2021-12-05 RX ORDER — BUPIVACAINE HYDROCHLORIDE AND EPINEPHRINE 5; 5 MG/ML; UG/ML
1 INJECTION, SOLUTION PERINEURAL ONCE
Status: COMPLETED | OUTPATIENT
Start: 2021-12-05 | End: 2021-12-05

## 2021-12-05 RX ORDER — ACETAMINOPHEN 500 MG
1000 TABLET ORAL ONCE
Status: COMPLETED | OUTPATIENT
Start: 2021-12-05 | End: 2021-12-05

## 2021-12-05 RX ADMIN — KETOROLAC TROMETHAMINE 30 MG: 30 INJECTION, SOLUTION INTRAMUSCULAR; INTRAVENOUS at 11:59

## 2021-12-05 RX ADMIN — ACETAMINOPHEN 1000 MG: 500 TABLET ORAL at 12:00

## 2021-12-05 RX ADMIN — ALUMINUM HYDROXIDE, MAGNESIUM HYDROXIDE, AND SIMETHICONE 30 ML: 200; 200; 20 SUSPENSION ORAL at 12:00

## 2021-12-05 RX ADMIN — BENZOCAINE: 220 GEL, DENTIFRICE DENTAL at 12:14

## 2021-12-05 RX ADMIN — BUPIVACAINE HYDROCHLORIDE AND EPINEPHRINE BITARTRATE 1 ML: 5; .005 INJECTION, SOLUTION SUBCUTANEOUS at 12:00

## 2021-12-05 RX ADMIN — CLINDAMYCIN HYDROCHLORIDE 450 MG: 150 CAPSULE ORAL at 12:00

## 2021-12-05 ASSESSMENT — PAIN SCALES - GENERAL
PAINLEVEL_OUTOF10: 10
PAINLEVEL_OUTOF10: 8

## 2021-12-05 ASSESSMENT — ENCOUNTER SYMPTOMS
SHORTNESS OF BREATH: 0
DIARRHEA: 0
SORE THROAT: 0
CONSTIPATION: 0
NAUSEA: 1
ABDOMINAL PAIN: 0
VOMITING: 0
TROUBLE SWALLOWING: 0
RHINORRHEA: 1

## 2021-12-05 NOTE — ED TRIAGE NOTES
Patient presents to ED via triage with c/o dental pain that is causing facial swelling. Patient states his dentist appointment is a week away from today.

## 2021-12-05 NOTE — ED PROVIDER NOTES
[facility]  Emergency Department  Faculty Attestation     I performed a history and physical examination of the patient and discussed management with the resident. I reviewed the residents note and agree with the documented findings and plan of care. Any areas of disagreement are noted on the chart. I was personally present for the key portions of any procedures. I have documented in the chart those procedures where I was not present during the key portions. I have reviewed the emergency nurses triage note. I agree with the chief complaint, past medical history, past surgical history, allergies, medications, social and family history as documented unless otherwise noted below. For Physician Assistant/ Nurse Practitioner cases/documentation I have personally evaluated this patient and have completed at least one if not all key elements of the E/M (history, physical exam, and MDM). Additional findings are as noted. Primary Care Physician:  RENETTA Saavedra - CNP    Screenings:  [unfilled]    CHIEF COMPLAINT       Chief Complaint   Patient presents with    Dental Pain       RECENT VITALS:   Temp: 99.3 °F (37.4 °C),  Pulse: 64, Resp: 16, BP: (!) 156/84    LABS:  Labs Reviewed - No data to display    Radiology  No orders to display       EKG:   EKG Interpretation    Interpreted by me    Rhythm: normal sinus   Rate: Bradycardia  Axis: normal  Ectopy: none  Conduction: normal  ST Segments: no acute change  T Waves: no acute change  Q Waves: none    Clinical Impression: Bradycardia, no acute changes    Attending Physician Additional  Notes    Patient been having several days of right upper tooth ache with facial swelling and gingival swelling and pain. No fever chills or sweats. Has had occasional episodes of atypical type chest pain without sweats nausea or radiation. He has had cardiac issues.   He has partial disease but unable to wear pedis had recent/prior dental extractions. On exam he is in mild distress secondary to pain, hypertensive, afebrile, but appears nontoxic. There is mild right facial swelling. There is gingival inflammation and tenderness. No true oral abscess noted. No trismus. There is some caries noted. Neck is supple. Card exam is without murmur. Skin is warm and dry with normal capillary refill. Impression is odontogenic infection with facial swelling, atypical chest pain. Plan is analgesics, dental block, antibiotics, dental follow-up. Alex Jones.  Kalee Devine MD, [address] [facility],[address]  Attending Emergency  Physician                Theresa Khan MD  12/05/21 5406

## 2021-12-05 NOTE — ED PROVIDER NOTES
[address] [place] [address] ED  Emergency Department Encounter  EmergencyMedicine Resident     Pt Name:Frank Beryle Reeds  MRN: [id number]  [place] 1970  Date of evaluation: 12/5/21  PCP:  RENETTA De La Garza - CNP    CHIEF COMPLAINT       Chief Complaint   Patient presents with    Dental Pain       HISTORY OF PRESENT ILLNESS  (Location/Symptom, Timing/Onset, Context/Setting, Quality, Duration, Modifying Factors, Severity.)      Alonso Blood is a 45 y.o. male who presents to the emergency department with a 2-day history of right-sided maxillary dental pain with significant pain with chewing. Patient has a significant history of multiple infected teeth requiring prior extractions and dental procedures and his next dentist appointment is about a week from now but he comes in today due to significant discomfort and pain with chewing on the right side. He endorses chills, \"indigestion\", and also incidentally mentions intermittent midsternal chest pain that patient states is fairly normal for him for the past year. He has ongoing work-up with cardiology and last catheterization in April 27, 2021 demonstrated mild coronary artery disease. States that the pain is not unusual for him and currently is not present. He denies fever, other HEENT symptoms other than some rhinorrhea, current chest pain, shortness of breath, abdominal pain, vomiting, or problems with urination or bowel movements. No new numbness or tingling anywhere. PAST MEDICAL / SURGICAL / SOCIAL / FAMILY HISTORY      has a past medical history of Bipolar 1 disorder ([facility] [address] and Herniated cervical disc.     has a past surgical history that includes Appendectomy and fracture surgery.     Social History     Socioeconomic History    Marital status:      Spouse name: Not on file    Number of children: Not on file    Years of education: Not on file    Highest education level: Not on file   Occupational History    Not on file   Tobacco Use    Smoking status: Current Every Day Smoker     Packs/day: 0.50     Types: Cigarettes    Smokeless tobacco: Never Used   Substance and Sexual Activity    Alcohol use: No    Drug use: Yes     Types: Marijuana George Coil)     Comment: daily    Sexual activity: Not on file   Other Topics Concern    Not on file   Social History Narrative    Not on file     Social Determinants of Health     Financial Resource Strain:     Difficulty of Paying Living Expenses: Not on file   Food Insecurity:     Worried About Running Out of Food in the Last Year: Not on file    To of Food in the Last Year: Not on file   Transportation Needs:     Lack of Transportation (Medical): Not on file    Lack of Transportation (Non-Medical): Not on file   Physical Activity:     Days of Exercise per Week: Not on file    Minutes of Exercise per Session: Not on file   Stress:     Feeling of Stress : Not on file   Social Connections:     Frequency of Communication with Friends and Family: Not on file    Frequency of Social Gatherings with Friends and Family: Not on file    Attends Yarsani Services: Not on file    Active Member of [address] [place] [address] or Organizations: Not on file    Attends Club or Organization Meetings: Not on file    Marital Status: Not on file   Intimate Partner Violence:     Fear of Current or Ex-Partner: Not on file    Emotionally Abused: Not on file    Physically Abused: Not on file    Sexually Abused: Not on file   Housing Stability:     Unable to Pay for Housing in the Last Year: Not on file    Number of [place] in the Last Year: Not on file    Unstable Housing in the Last Year: Not on file       History reviewed. No pertinent family history. Allergies:  Codeine, Pcn [penicillins], and Sulfa antibiotics    Home Medications:  Prior to Admission medications    Medication Sig Start Date End Date Taking?  Authorizing Provider   clindamycin (CLEOCIN) 150 MG capsule Take 3 capsules by mouth 3 times daily for 7 days 12/5/21 12/12/21 Yes Kevin Dukes MD   acetaminophen (TYLENOL) 500 MG tablet Take 2 tablets by mouth 3 times daily for 10 days 12/5/21 12/15/21 Yes Kevin Dukes MD   ibuprofen (IBU) 800 MG tablet Take 1 tablet by mouth every 6 hours as needed for Pain 12/5/21  Yes Kevin Dukes MD   divalproex (DEPAKOTE) 500 MG DR tablet Take 500 mg by mouth 3 times daily    Historical Provider, MD   sertraline (ZOLOFT) 100 MG tablet Take 200 mg by mouth daily    Historical Provider, MD   tamsulosin (FLOMAX) 0.4 MG capsule Take 0.4 mg by mouth daily    Historical Provider, MD   amLODIPine (NORVASC) 2.5 MG tablet Take 2.5 mg by mouth daily    Historical Provider, MD   tiotropium (SPIRIVA RESPIMAT) 1.25 MCG/ACT AERS inhaler Inhale 2 puffs into the lungs daily    Historical Provider, MD   losartan (COZAAR) 50 MG tablet Take 50 mg by mouth daily 4/22/21   Historical Provider, MD   gabapentin (NEURONTIN) 100 MG capsule Take 100 mg by mouth 3 times daily. Historical Provider, MD   benzonatate (TESSALON) 100 MG capsule Take 100 mg by mouth 3 times daily as needed for Cough    Historical Provider, MD   guanFACINE HCl (TENEX PO) Take 1 mg by mouth    Historical Provider, MD   methadone 5 MG/5ML solution Take 135 mg by mouth daily. Takes at 0700 am 4/12/21   Historical Provider, MD       REVIEW OF SYSTEMS    (2-9 systems for level 4, 10 or more for level 5)      Review of Systems   Constitutional: Negative for chills and fever. HENT: Positive for dental problem and rhinorrhea. Negative for drooling, ear pain, hearing loss, sore throat and trouble swallowing. Eyes: Positive for visual disturbance (\"A little blurry\"). Respiratory: Negative for shortness of breath. Cardiovascular: Negative for chest pain (Not present currently, but was present this morning). Gastrointestinal: Positive for nausea (\"indigestion\"). Negative for abdominal pain, constipation, diarrhea and vomiting.    Genitourinary: Negative for difficulty urinating and dysuria. Musculoskeletal: Negative for arthralgias and myalgias. Neurological: Negative for weakness and numbness. Psychiatric/Behavioral: Negative for agitation and confusion. PHYSICAL EXAM   (up to 7 for level 4, 8 or more for level 5)      INITIAL VITALS:   BP (!) 156/84   Pulse 64   Temp 99.3 °F (37.4 °C) (Oral)   Resp 16   Ht 6' (1.829 m)   Wt 170 lb (77.1 kg)   SpO2 98%   BMI 23.06 kg/m²     Physical Exam  Vitals and nursing note reviewed. Constitutional:       General: He is not in acute distress. Appearance: Normal appearance. He is well-developed. He is not ill-appearing or diaphoretic. HENT:      Head: Normocephalic and atraumatic. Right Ear: External ear normal.      Left Ear: External ear normal.      Nose: Nose normal.      Mouth/Throat:      Mouth: Mucous membranes are moist.      Comments: Dental examination demonstrates gingivitis in the right maxillary region. There is erosion of the right bicuspid tooth with tenderness to palpation of the tooth, no area of fluctuance however. The teeth posterior to this tooth have been removed previously. There is no uvula deviation and tonsils do not have exudate. There is mild tenderness to palpation of the right zygomatic cheek region with no fluctuance or erythema. Eyes:      Extraocular Movements: Extraocular movements intact. Conjunctiva/sclera: Conjunctivae normal.   Neck:      Trachea: No tracheal deviation. Cardiovascular:      Rate and Rhythm: Normal rate and regular rhythm. Heart sounds: Normal heart sounds. No murmur heard. No friction rub. No gallop. Pulmonary:      Effort: Pulmonary effort is normal. No respiratory distress. Breath sounds: Normal breath sounds. No wheezing, rhonchi or rales. Abdominal:      General: Abdomen is flat. There is no distension. Musculoskeletal:         General: No swelling, deformity or signs of injury. Normal range of motion.       Cervical back: Normal range of motion and neck supple. Skin:     General: Skin is warm and dry. Coloration: Skin is not jaundiced. Findings: No bruising or lesion. Neurological:      General: No focal deficit present. Mental Status: He is alert and oriented to person, place, and time. Mental status is at baseline. Motor: No abnormal muscle tone. DIFFERENTIAL  DIAGNOSIS     PLAN (LABS / IMAGING / EKG):  Orders Placed This Encounter   Procedures    EKG 12 Lead       MEDICATIONS ORDERED:  Orders Placed This Encounter   Medications    acetaminophen (TYLENOL) tablet 1,000 mg    ketorolac (TORADOL) injection 30 mg    clindamycin (CLEOCIN) capsule 450 mg     Order Specific Question:   Antimicrobial Indications     Answer:   Head and Neck Infection    bupivacaine-EPINEPHrine (MARCAINE-w/EPINEPHRINE) 0.5% -1:062050 injection 1 mL    aluminum & magnesium hydroxide-simethicone (MAALOX) 200-200-20 MG/5ML suspension 30 mL    DISCONTD: benzocaine (ORAJEL) 10 % mucosal gel    benzocaine (LOLLICAINE) 20 % dental swab    clindamycin (CLEOCIN) 150 MG capsule     Sig: Take 3 capsules by mouth 3 times daily for 7 days     Dispense:  63 capsule     Refill:  0    acetaminophen (TYLENOL) 500 MG tablet     Sig: Take 2 tablets by mouth 3 times daily for 10 days     Dispense:  60 tablet     Refill:  0    ibuprofen (IBU) 800 MG tablet     Sig: Take 1 tablet by mouth every 6 hours as needed for Pain     Dispense:  21 tablet     Refill:  0       DDX: Brian Thompson is a 45 y.o. male who presents to the emergency department with right-sided dental pain. Differential diagnosis includes periodontitis, gingivitis    DIAGNOSTIC RESULTS / EMERGENCY DEPARTMENT COURSE / MDM   LAB RESULTS:  No results found for this visit on 12/05/21. IMPRESSION: Brian Thompson is a 45 y.o. male who presents to the emergency department with right-sided dental pain.   On examination his temperature is 99.2 °F and blood pressure is 156/84 and examination demonstrates an uncomfortable appearing male of stated age with some swelling of the right cheek comparing to the left with no fluctuance. There is swelling and erythema of the right maxillary gumline concerning for gingivitis but no foy or fluctuance concerning for abscess. The right bicuspid tooth is tender to palpation and appears severely eroded which is consistent with the patient's history of multiple prior tooth extractions. Patient is an active smoker. We will plan for apical block of this tooth, Tylenol, Toradol, and antibiotics but patient has a history of hives with reaction to penicillin so we will administer clindamycin instead. Patient verbalizes understanding and agreement with plan. The patient has had significant prior cardiac work-up and was complaining of chest pain this morning so we will obtain screening EKG but as the patient currently has no chest pain do not feel that further work-up is warranted at this time, however counseled patient on strict return precautions and need for close interval follow-up. RADIOLOGY:  No results found. EKG  EKG Interpretation    Interpreted by emergency department physician    Rhythm: Sinus bradycardia  Rate: 54  Axis: normal  Ectopy: none  Conduction: normal  ST Segments: no acute change  T Waves: no acute change  Q Waves: none    Clinical Impression: no acute changes and normal EKG    Wilder Yang MD    All EKG's are interpreted by the Emergency Department Physician who either signs or co-signs this chart in the absence of a cardiologist.    EMERGENCY DEPARTMENT COURSE:       PROCEDURES:  PROCEDURE NOTE - DENTAL BLOCK    PATIENT NAME: Luciano Cruz RECORD NO. [id number]  DATE: 12/5/2021  ATTENDING PHYSICIAN: Jordi    PREOPERATIVE DIAGNOSIS:  Dental Pain  POSTOPERATIVE DIAGNOSIS:  Same  PROCEDURE PERFORMED:  right apical block  PERFORMING PHYSICIAN: Demian Huizar MD    DISCUSSION:  Simon Long is a 45y.o.-year-old male who requires local dental block for pain relief. The history and physical examination were reviewed and confirmed. CONSENT: The patient provided verbal consent for this procedure. PROCEDURE: The patient was placed in supine position. Topical anesthetic was placed over injection site. A right apical block was performed by injecting 1.5 cc of 0.5% Bupivacaine with epinephrine    The patient tolerated the procedure well. Complications include None     Brittni Fernández MD  12:19 PM, 12/5/21      CONSULTS:  None    CRITICAL CARE:  None    FINAL IMPRESSION      1. Dental infection          DISPOSITION / PLAN     DISPOSITION Discharge - Pending Orders Complete 12/05/2021 12:03:18 PM      PATIENT REFERRED TO:  Edith Quiroga, APRN - CNP  [address] [facility] [address] [phone]    Schedule an appointment as soon as possible for a visit in 1 week  For followup    [facility]  [address] [address]  [phone]  Go to   As needed, If symptoms worsen    [facility] [address]  [address] [facility]  [phone]  Schedule an appointment as soon as possible for a visit in 3 days  For followup      DISCHARGE MEDICATIONS:  New Prescriptions    ACETAMINOPHEN (TYLENOL) 500 MG TABLET    Take 2 tablets by mouth 3 times daily for 10 days    CLINDAMYCIN (CLEOCIN) 150 MG CAPSULE    Take 3 capsules by mouth 3 times daily for 7 days    IBUPROFEN (IBU) 800 MG TABLET    Take 1 tablet by mouth every 6 hours as needed for Pain       Brittni Fernández MD  Emergency Medicine Resident    This patient was evaluated in the Emergency Department for symptoms described in the history of present illness. He/she was evaluated in the context of the global COVID-19 pandemic, which necessitated consideration that the patient might be at risk for infection with the SARS-CoV-2 virus that causes COVID-19.  Institutional protocols and algorithms that pertain to the evaluation of patients at risk for COVID-19 are in a state of rapid change based on information released by regulatory bodies including the CDC and federal and state organizations. These policies and algorithms were followed during the patient's care in the ED.     (Please note that portions of thisnote were completed with a voice recognition program.  Efforts were made to edit the dictations but occasionally words are mis-transcribed.)        Raheel Darling MD  Resident  12/05/21 8407

## 2021-12-05 NOTE — ED NOTES
Dental appoitment made per SW per pt's request.  \"not happy with present dentist.\"'     Ofe Duncan RN  12/05/21 9707

## 2021-12-05 NOTE — ED NOTES
Social work: dental appointment put in/faxed for Monday 12-6-21  At 3pm or 1500. Family does go there and knew the way there.   Kirill Barrera lsw     Lexa Martino MSW, LSW  12/05/21 1257

## 2021-12-05 NOTE — Clinical Note
Davion Jimenez was seen and treated in our emergency department on 12/5/2021. He may return to work on 12/07/2021. If you have any questions or concerns, please don't hesitate to call.       Juanita Escobar MD

## 2021-12-06 LAB
EKG ATRIAL RATE: 54 BPM
EKG P AXIS: 74 DEGREES
EKG P-R INTERVAL: 138 MS
EKG Q-T INTERVAL: 458 MS
EKG QRS DURATION: 92 MS
EKG QTC CALCULATION (BAZETT): 434 MS
EKG R AXIS: 65 DEGREES
EKG T AXIS: 54 DEGREES
EKG VENTRICULAR RATE: 54 BPM

## 2021-12-06 PROCEDURE — 93010 ELECTROCARDIOGRAM REPORT: CPT | Performed by: INTERNAL MEDICINE

## 2022-05-17 ENCOUNTER — HOSPITAL ENCOUNTER (OUTPATIENT)
Age: 52
Setting detail: OBSERVATION
Discharge: HOME | End: 2022-05-18
Attending: EMERGENCY MEDICINE | Admitting: EMERGENCY MEDICINE
Payer: COMMERCIAL

## 2022-05-17 ENCOUNTER — APPOINTMENT (OUTPATIENT)
Dept: GENERAL RADIOLOGY | Age: 52
End: 2022-05-17
Payer: COMMERCIAL

## 2022-05-17 DIAGNOSIS — R07.9 CHEST PAIN, UNSPECIFIED TYPE: Primary | ICD-10-CM

## 2022-05-17 LAB
ABSOLUTE EOS #: 0.04 K/UL (ref 0–0.44)
ABSOLUTE IMMATURE GRANULOCYTE: 0.04 K/UL (ref 0–0.3)
ABSOLUTE LYMPH #: 2.25 K/UL (ref 1.1–3.7)
ABSOLUTE MONO #: 0.55 K/UL (ref 0.1–1.2)
ANION GAP SERPL CALCULATED.3IONS-SCNC: 15 MMOL/L (ref 9–17)
BASOPHILS # BLD: 0 % (ref 0–2)
BASOPHILS ABSOLUTE: <0.03 K/UL (ref 0–0.2)
BUN BLDV-MCNC: 6 MG/DL (ref 6–20)
CALCIUM SERPL-MCNC: 9.9 MG/DL (ref 8.6–10.4)
CHLORIDE BLD-SCNC: 98 MMOL/L (ref 98–107)
CO2: 25 MMOL/L (ref 20–31)
CREAT SERPL-MCNC: 0.75 MG/DL (ref 0.7–1.2)
D-DIMER QUANTITATIVE: 0.25 MG/L FEU
EOSINOPHILS RELATIVE PERCENT: 0 % (ref 1–4)
GFR AFRICAN AMERICAN: >60 ML/MIN
GFR NON-AFRICAN AMERICAN: >60 ML/MIN
GFR SERPL CREATININE-BSD FRML MDRD: ABNORMAL ML/MIN/{1.73_M2}
GLUCOSE BLD-MCNC: 112 MG/DL (ref 70–99)
HCT VFR BLD CALC: 48.8 % (ref 40.7–50.3)
HEMOGLOBIN: 15.9 G/DL (ref 13–17)
IMMATURE GRANULOCYTES: 0 %
LYMPHOCYTES # BLD: 24 % (ref 24–43)
MAGNESIUM: 2.1 MG/DL (ref 1.6–2.6)
MCH RBC QN AUTO: 30.3 PG (ref 25.2–33.5)
MCHC RBC AUTO-ENTMCNC: 32.6 G/DL (ref 28.4–34.8)
MCV RBC AUTO: 93 FL (ref 82.6–102.9)
MONOCYTES # BLD: 6 % (ref 3–12)
NRBC AUTOMATED: 0 PER 100 WBC
PDW BLD-RTO: 12.7 % (ref 11.8–14.4)
PLATELET # BLD: 400 K/UL (ref 138–453)
PMV BLD AUTO: 10.8 FL (ref 8.1–13.5)
POTASSIUM SERPL-SCNC: 3.5 MMOL/L (ref 3.7–5.3)
RBC # BLD: 5.25 M/UL (ref 4.21–5.77)
SARS-COV-2, RAPID: NOT DETECTED
SEG NEUTROPHILS: 70 % (ref 36–65)
SEGMENTED NEUTROPHILS ABSOLUTE COUNT: 6.48 K/UL (ref 1.5–8.1)
SODIUM BLD-SCNC: 138 MMOL/L (ref 135–144)
SPECIMEN DESCRIPTION: NORMAL
TROPONIN, HIGH SENSITIVITY: 10 NG/L (ref 0–22)
TROPONIN, HIGH SENSITIVITY: 10 NG/L (ref 0–22)
WBC # BLD: 9.4 K/UL (ref 3.5–11.3)

## 2022-05-17 PROCEDURE — G0378 HOSPITAL OBSERVATION PER HR: HCPCS

## 2022-05-17 PROCEDURE — 84484 ASSAY OF TROPONIN QUANT: CPT

## 2022-05-17 PROCEDURE — 93005 ELECTROCARDIOGRAM TRACING: CPT | Performed by: STUDENT IN AN ORGANIZED HEALTH CARE EDUCATION/TRAINING PROGRAM

## 2022-05-17 PROCEDURE — 6370000000 HC RX 637 (ALT 250 FOR IP): Performed by: EMERGENCY MEDICINE

## 2022-05-17 PROCEDURE — 96375 TX/PRO/DX INJ NEW DRUG ADDON: CPT

## 2022-05-17 PROCEDURE — 71045 X-RAY EXAM CHEST 1 VIEW: CPT

## 2022-05-17 PROCEDURE — 85025 COMPLETE CBC W/AUTO DIFF WBC: CPT

## 2022-05-17 PROCEDURE — 99285 EMERGENCY DEPT VISIT HI MDM: CPT

## 2022-05-17 PROCEDURE — 6360000002 HC RX W HCPCS: Performed by: STUDENT IN AN ORGANIZED HEALTH CARE EDUCATION/TRAINING PROGRAM

## 2022-05-17 PROCEDURE — 80048 BASIC METABOLIC PNL TOTAL CA: CPT

## 2022-05-17 PROCEDURE — 85379 FIBRIN DEGRADATION QUANT: CPT

## 2022-05-17 PROCEDURE — 96374 THER/PROPH/DIAG INJ IV PUSH: CPT

## 2022-05-17 PROCEDURE — 83735 ASSAY OF MAGNESIUM: CPT

## 2022-05-17 PROCEDURE — 87635 SARS-COV-2 COVID-19 AMP PRB: CPT

## 2022-05-17 RX ORDER — ATORVASTATIN CALCIUM 40 MG/1
40 TABLET, FILM COATED ORAL DAILY
COMMUNITY
Start: 2022-02-17

## 2022-05-17 RX ORDER — FUROSEMIDE 20 MG/1
20 TABLET ORAL DAILY
COMMUNITY
Start: 2022-05-09

## 2022-05-17 RX ORDER — ASPIRIN 81 MG/1
324 TABLET, CHEWABLE ORAL ONCE
Status: COMPLETED | OUTPATIENT
Start: 2022-05-17 | End: 2022-05-17

## 2022-05-17 RX ORDER — ONDANSETRON 2 MG/ML
4 INJECTION INTRAMUSCULAR; INTRAVENOUS ONCE
Status: COMPLETED | OUTPATIENT
Start: 2022-05-17 | End: 2022-05-17

## 2022-05-17 RX ORDER — FLUOXETINE HYDROCHLORIDE 20 MG/1
CAPSULE ORAL
COMMUNITY
Start: 2022-03-25

## 2022-05-17 RX ORDER — MORPHINE SULFATE 4 MG/ML
4 INJECTION, SOLUTION INTRAMUSCULAR; INTRAVENOUS ONCE
Status: COMPLETED | OUTPATIENT
Start: 2022-05-17 | End: 2022-05-17

## 2022-05-17 RX ORDER — VALSARTAN 160 MG/1
160 TABLET ORAL NIGHTLY
COMMUNITY
Start: 2022-02-24

## 2022-05-17 RX ORDER — ONDANSETRON 2 MG/ML
4 INJECTION INTRAMUSCULAR; INTRAVENOUS ONCE
Status: DISCONTINUED | OUTPATIENT
Start: 2022-05-17 | End: 2022-05-17

## 2022-05-17 RX ORDER — LORAZEPAM 1 MG/1
.5-1 TABLET ORAL EVERY 6 HOURS PRN
Status: ON HOLD | COMMUNITY
Start: 2022-04-28 | End: 2022-05-17

## 2022-05-17 RX ORDER — LORAZEPAM 2 MG/ML
1 INJECTION INTRAMUSCULAR ONCE
Status: DISCONTINUED | OUTPATIENT
Start: 2022-05-17 | End: 2022-05-17

## 2022-05-17 RX ADMIN — MORPHINE SULFATE 4 MG: 4 INJECTION INTRAVENOUS at 20:02

## 2022-05-17 RX ADMIN — ONDANSETRON 4 MG: 2 INJECTION INTRAMUSCULAR; INTRAVENOUS at 20:01

## 2022-05-17 RX ADMIN — ASPIRIN 324 MG: 81 TABLET, CHEWABLE ORAL at 20:00

## 2022-05-17 ASSESSMENT — PAIN DESCRIPTION - LOCATION: LOCATION: CHEST

## 2022-05-17 ASSESSMENT — PAIN SCALES - GENERAL: PAINLEVEL_OUTOF10: 7

## 2022-05-17 ASSESSMENT — PAIN DESCRIPTION - DESCRIPTORS: DESCRIPTORS: ACHING

## 2022-05-17 ASSESSMENT — PAIN DESCRIPTION - ORIENTATION: ORIENTATION: LEFT

## 2022-05-17 NOTE — ED PROVIDER NOTES
[facility]  Emergency Department  Faculty Attestation     I performed a history and physical examination of the patient and discussed management with the resident. I reviewed the residents note and agree with the documented findings and plan of care. Any areas of disagreement are noted on the chart. I was personally present for the key portions of any procedures. I have documented in the chart those procedures where I was not present during the key portions. I have reviewed the emergency nurses triage note. I agree with the chief complaint, past medical history, past surgical history, allergies, medications, social and family history as documented unless otherwise noted below. For Physician Assistant/ Nurse Practitioner cases/documentation I have personally evaluated this patient and have completed at least one if not all key elements of the E/M (history, physical exam, and MDM). Additional findings are as noted. Primary Care Physician:  RENETTA Mccurdy - CNP    Screenings:  [unfilled]    CHIEF COMPLAINT       Chief Complaint   Patient presents with    Chest Pain     hx kianna, sent by cardio       RECENT VITALS:   Temp: 99.1 °F (37.3 °C),  Pulse: 89, Resp: 18, BP: (!) 165/75    LABS:  Labs Reviewed - No data to display    Radiology  No orders to display       CRITICAL CARE: There was a high probability of clinically significant/life threatening deterioration in this patient's condition which required my urgent intervention. Total critical care time was none minutes. This excludes any time for separately reportable procedures.      EKG:   EKG Interpretation    Interpreted by me    Rhythm: normal sinus   Rate: normal  Axis: normal  Ectopy: none  Conduction: normal  ST Segments: Subtle depression V4  T Waves: no acute change  U wave noted  Q Waves: none    Clinical Impression: LVH, nonspecific ST changes, U wave    Attending Physician Additional  Notes    Patient has 2 days of substernal chest pain that he describes as in his heart. Its worse with supine position and better crossing his arms and leaning forward. Its constant nature even when he sleeping. Some heartburn symptoms noted. There is chills and shakes but no sore throat runny nose cough or myalgias. No documented fevers. He does have shortness of breath. There is also mild abdominal pain but no nausea. No pain with swallowing. He has a history of bradycardia with a normal heart rate in the 50s that now has been in the high 40s. This has him quite concerned that he feels anxious. He states that once he knew he was coming here he became very anxious, tremulous and now has tachycardia. He has no history of lung disease but still smokes. He has had normal cardiac catheterization. He is on amlodipine for hypertension. On exam he is anxious, tremulous, afebrile, no respiratory distress but saturations of 92% with good pulse oximetry waveform, hypertensive. He has mild JVD. Lungs are clear without expiratory prolongation or wheezing. Cardiac exam has a flow murmur at the apex no gallop or rub noted. Abdomen is soft mild epigastric tenderness. No edema cords Homans or calf tenderness. Normal pulses. Impression is chest pain, bradycardia, anxiety, hypoxemia, consider viral syndrome. Plan is chest x-ray, anxiolytics, aspirin, simple analgesics, trend troponins, consider COVID and influenza assay, consider trial of PPI. Oswaldo Hager.  Sonali Smith MD, [facility]  Attending Emergency  Physician                Mark Oconnor MD  05/17/22 1949       Mark Oconnor MD  05/18/22 1410

## 2022-05-17 NOTE — ED PROVIDER NOTES
[facility] ED  Emergency Department Encounter  Emergency Medicine Resident     Pt Name: Wen Bhatti  MRN: [id number]  Blancagfstephen 1970  Date of evaluation: 5/17/22  PCP:  RENETTA Castillo CNP    CHIEF COMPLAINT       Chief Complaint   Patient presents with    Chest Pain     hx kianna, sent by cardio       HISTORY OFPRESENT ILLNESS  (Location/Symptom, Timing/Onset, Context/Setting, Quality, Duration, Modifying [place].)      Wen Bhatti is a 47 y.o. male with past medical history significant for tachycardia, hypertension, CHF with ports of chest pain which has been worsening for the past 2 days. Constant. Left-sided. Better with leaning forward and hugging a pillow. Denies any hemoptysis. Denies any history of previous DVT or PE. Patient does have history of bradycardia, follows with [facility]. Patient denies any fevers or chills. Is an everyday smoker. Does use methadone, has been controlled on methadone for quite some time. Has been taking his medications as prescribed. Denies any missed doses of his medications. Patient was placed on Lasix for the past 1 week. States that this has resolved some of his peripheral edema. Patient denies any significant abdominal pain, nausea, diaphoresis with the chest pain. Is unsure what makes it better although states that when he is up and moving around or bending over does make the chest pain worse. Denies any IV drug use. Denies any alcohol use. Denies any trauma to the chest.  Is not on any blood thinning medication, did not take any aspirin prior to arrival.  States that the pain is 10 out of 10 in nature, sharp in nature, constant.   Patient was concerned and has been uncontrolled as an outpatient by his family practice provider as well as cardiology and therefore came into the emergency department due to concerns for his bradycardia which she noted he had a heart rate of 37 on his Fitbit prior to coming into the emergency department. PAST MEDICAL / SURGICAL / SOCIAL / FAMILY HISTORY      has a past medical history of Bipolar 1 disorder ([address] and Herniated cervical disc.     has a past surgical history that includes Appendectomy and fracture surgery. Social:  reports that he has been smoking cigarettes. He has been smoking about 0.50 packs per day. He has never used smokeless tobacco. He reports current drug use. Drug: Marijuana [place]). He reports that he does not drink alcohol. Family Hx: History reviewed. No pertinent family history. Allergies:  Codeine, Pcn [penicillins], and Sulfa antibiotics    Home Medications:  Prior to Admission medications    Medication Sig Start Date End Date Taking? Authorizing Provider   furosemide (LASIX) 20 MG tablet Take 20 mg by mouth daily 5/9/22  Yes Historical Provider, MD   atorvastatin (LIPITOR) 40 MG tablet Take 40 mg by mouth daily 2/17/22  Yes Historical Provider, MD   FLUoxetine (PROZAC) 20 MG capsule  3/25/22  Yes Historical Provider, MD   valsartan (DIOVAN) 160 MG tablet Take 160 mg by mouth nightly 2/24/22  Yes Historical Provider, MD   Testosterone Cypionate 200 MG/ML KIT Inject into the muscle. Yes Historical Provider, MD   acetaminophen (TYLENOL) 500 MG tablet Take 2 tablets by mouth 3 times daily for 10 days 12/5/21 12/15/21  Arie Bai MD   divalproex (DEPAKOTE) 500 MG DR tablet Take 500 mg by mouth 3 times daily    Historical Provider, MD   tamsulosin (FLOMAX) 0.4 MG capsule Take 0.4 mg by mouth daily    Historical Provider, MD   amLODIPine (NORVASC) 2.5 MG tablet Take 2.5 mg by mouth daily    Historical Provider, MD   tiotropium (SPIRIVA RESPIMAT) 1.25 MCG/ACT AERS inhaler Inhale 2 puffs into the lungs daily    Historical Provider, MD   gabapentin (NEURONTIN) 100 MG capsule Take 100 mg by mouth 3 times daily. Historical Provider, MD   methadone 5 MG/5ML solution Take 135 mg by mouth daily.  Takes at 0700 am 4/12/21   Historical Provider, MD REVIEW OFSYSTEMS    (2-9 systems for level 4, 10 or more for level 5)      Positive: Bradycardia, chest pain, pleuritic chest pain, shortness of breath, swelling bilateral lower extremities    Negative:, Chills, eye pain, ear pain, congestion, cough, productive cough, hemoptysis, abdominal pain, nausea, vomiting, pain with urination    PHYSICAL EXAM   (up to 7 for level 4, 8 or more forlevel 5)      INITIAL VITALS:   Vitals:    05/17/22 2327   BP: (!) 170/79   Pulse: 67   Resp: 19   Temp: 100 °F (37.8 °C)   SpO2: 96%        Physical Exam  Vitals and nursing note reviewed. Constitutional:       General: He is not in acute distress. Appearance: Normal appearance. He is not ill-appearing, toxic-appearing or diaphoretic. Comments: Is alert, oriented, answering questions appropriately. In no acute distress although does appear to be anxious. HENT:      Head: Normocephalic and atraumatic. Nose: Nose normal.      Mouth/Throat:      Mouth: Mucous membranes are moist.      Pharynx: Oropharynx is clear. Eyes:      General:         Right eye: No discharge. Left eye: No discharge. Neck:      Comments: Moving neck freely upon my examination   Cardiovascular:      Rate and Rhythm: Normal rate and regular rhythm. Pulses: Normal pulses. Heart sounds: Normal heart sounds. Comments: Radial Pulses 2+ and palpable bilaterally  Pulmonary:      Effort: Pulmonary effort is normal. No respiratory distress. Breath sounds: Wheezing (Scattered wheezes throughout) present. No rhonchi or rales. Abdominal:      General: Abdomen is flat. There is no distension. Palpations: Abdomen is soft. Tenderness: There is no abdominal tenderness. There is no guarding or rebound. Musculoskeletal:      Cervical back: Normal range of motion. No rigidity. Right lower leg: Edema present. Left lower leg: Edema present.       Comments: Non Pitting edema noted bilateral lower extremities. Skin:     General: Skin is warm and dry. Capillary Refill: Capillary refill takes less than 2 seconds. Neurological:      General: No focal deficit present. Mental Status: He is alert and oriented to person, place, and time. Psychiatric:         Mood and Affect: Mood normal.         Behavior: Behavior normal.      Comments: Anxious appearing         DIFFERENTIAL  DIAGNOSIS       Initial MDM/Plan: 47 y.o. male who presents with days of chest pain. Upon my initial examination patient is resting in the cot, no acute distress, appears anxious although is not toxic, not ill-appearing. Patient presents with vital signs notable for mild hypertension otherwise patient is afebrile, nontachycardic, saturating 96% on room air, nontachypneic. Phonating well, alert, oriented, answering questions appropriately. Patient has past medical history significant for hypertension as well as bradycardia, recently placed on Lasix, does follow with outpatient cardiology, no previous heart attacks, daily methadone. Elevated heart score. High risk. HPI concerning due to bradycardia and uncontrolled hypertension as outpatient, failure of outpatient treatment. Discussed with patient that we will do a cardiac work-up and likely plan for admission. Patient is compliant with this. We will plan for cardiac work-up to include CBC, BMP, magnesium, troponins x2, chest x-ray, EKG. Aspirin 324 mg provided. Morphine for symptomatic relief. Pending laboratory and imaging work-up will decide on disposition. Cath report reviewed from April, 2021 reviewed. Mild CAD, medical management recommended at that time.     EMERGENCY DEPARTMENT COURSE:  ED Course as of 05/18/22 0322 Tue May 17, 2022   2054 D-Dimer, Quant: 0.25  Negative no indication for CT PE  [MA]   2152 CBC with Auto Differential(!):    WBC 9.4   RBC 5.25   Hemoglobin Quant 15.9   Hematocrit 48.8   MCV 93.0   MCH 30.3   MCHC 32.6   RDW 12.7 Platelet Count 138   MPV 10.8   NRBC Automated 0.0   Seg Neutrophils 70(!)   Lymphocytes 24   Monocytes 6   Eosinophils % 0(!)   Basophils 0   Immature Granulocytes 0   Segs Absolute 6.48   Absolute Lymph # 2.25   Absolute Mono # 0.55   Absolute Eos # 0.04   Basophils Absolute <0.03   Absolute Immature Granulocyte 0.04  Within normal limits [MA]   2996 Basic Metabolic Panel(!):    GLUCOSE, FASTING,(!)   BUN,BUNPL 6   Creatinine 0.75   CALCIUM, SERUM, 113088 9.9   Sodium 138   Potassium 3.5(!)   Chloride 98   CO2 25   Anion Gap 15   GFR Non- >60   GFR  >60   GFR Comment       Unremarkable [MA]   2233 Magnesium:    Magnesium 2.1  Within normal limits  [MA]   2234 COVID-19, Rapid:    Specimen Description . NASOPHARYNGEAL SWAB   SARS-CoV-2, Rapid Not Detected  Negative  [MA]   0988 XR CHEST PORTABLE  FINDINGS:  The lungs are without acute focal process. There is no effusion or  pneumothorax. The cardiomediastinal silhouette is stable. The osseous  structures are stable.     IMPRESSION:  No acute process. [MA]   2234 Troponin:    Troponin, High Sensitivity 10  No delta troponin, will plan for admission to observation unit for cardiology evaluation in the morning due to patient's significant cardiac history and elevated heart score. [MA]   2235 Patient and wife at bedside updated on laboratory findings, imaging findings, EKG. Updated that I recommend admission to be evaluated by cardiology team.  Both are compliant and understanding of this. Patient okay to eat this evening although n.p.o. after midnight. Patient to be admitted to observation unit for cardiology evaluation in the morning. Patient boarding in the emergency department awaiting bed placement. Admitted in vitally stable condition after meeting vitally stable throughout Emergency Department stay.  [MA]      ED Course User Index  [MA] Juan Colorado,         DIAGNOSTIC RESULTS / Juliane Booker COURSE / MDM LABS:  Labs Reviewed   CBC WITH AUTO DIFFERENTIAL - Abnormal; Notable for the following components:       Result Value    Seg Neutrophils 70 (*)     Eosinophils % 0 (*)     All other components within normal limits   BASIC METABOLIC PANEL - Abnormal; Notable for the following components:    Glucose 112 (*)     Potassium 3.5 (*)     All other components within normal limits   COVID-19, RAPID   MAGNESIUM   TROPONIN   D-DIMER, QUANTITATIVE   TROPONIN   TROPONIN         RADIOLOGY:  XR CHEST PORTABLE    Result Date: 5/17/2022  EXAMINATION: ONE XRAY VIEW OF THE CHEST 5/17/2022 5:02 pm COMPARISON: 06/06/2021 HISTORY: ORDERING SYSTEM PROVIDED HISTORY: chest pain, 2 days, left sided, sharp, pleuritic TECHNOLOGIST PROVIDED HISTORY: chest pain, 2 days, left sided, sharp, pleuritic FINDINGS: The lungs are without acute focal process. There is no effusion or pneumothorax. The cardiomediastinal silhouette is stable. The osseous structures are stable. No acute process. EKG  Rate normal, normal sinus rhythm, intervals within normal limits including MO, QRS, QT/QTc, no ST segment elevation, no ST segment depression, no T wave abnormalities noted. Nonacute EKG compared to EKG performed on 12/5/2021, bradycardia was noted at that time, no bradycardia noted at this time. All EKG's are interpreted by the Emergency Department Physicianwho either signs or Co-signs this chart in the absence of a cardiologist.        PROCEDURES:  None    CONSULTS:  IP CONSULT TO CARDIOLOGY      FINAL IMPRESSION      1. Chest pain, unspecified type          DISPOSITION / [address] Admitted 05/17/2022 10:37:00 PM      PATIENT REFERRED TO:  No follow-up provider specified.     DISCHARGE MEDICATIONS:  Current Discharge Medication List          Bob Curtis DO  Emergency Medicine Resident    (Please note that portions of this note were completed with a voice recognition program.Efforts were made to edit the dictations but occasionally words are mis-transcribed.)       Diya Hutchinson,   Resident  05/18/22 7609

## 2022-05-18 VITALS
DIASTOLIC BLOOD PRESSURE: 68 MMHG | BODY MASS INDEX: 23.03 KG/M2 | HEIGHT: 72 IN | TEMPERATURE: 98 F | SYSTOLIC BLOOD PRESSURE: 132 MMHG | OXYGEN SATURATION: 98 % | RESPIRATION RATE: 18 BRPM | HEART RATE: 57 BPM | WEIGHT: 170 LBS

## 2022-05-18 PROBLEM — R07.89 CHEST PAIN, ATYPICAL: Status: ACTIVE | Noted: 2022-05-18

## 2022-05-18 LAB — TROPONIN, HIGH SENSITIVITY: 9 NG/L (ref 0–22)

## 2022-05-18 PROCEDURE — 6370000000 HC RX 637 (ALT 250 FOR IP): Performed by: STUDENT IN AN ORGANIZED HEALTH CARE EDUCATION/TRAINING PROGRAM

## 2022-05-18 PROCEDURE — 6370000000 HC RX 637 (ALT 250 FOR IP): Performed by: EMERGENCY MEDICINE

## 2022-05-18 PROCEDURE — 6360000002 HC RX W HCPCS: Performed by: STUDENT IN AN ORGANIZED HEALTH CARE EDUCATION/TRAINING PROGRAM

## 2022-05-18 PROCEDURE — 96372 THER/PROPH/DIAG INJ SC/IM: CPT

## 2022-05-18 PROCEDURE — 96376 TX/PRO/DX INJ SAME DRUG ADON: CPT

## 2022-05-18 PROCEDURE — 36415 COLL VENOUS BLD VENIPUNCTURE: CPT

## 2022-05-18 PROCEDURE — G0378 HOSPITAL OBSERVATION PER HR: HCPCS

## 2022-05-18 PROCEDURE — 2580000003 HC RX 258: Performed by: STUDENT IN AN ORGANIZED HEALTH CARE EDUCATION/TRAINING PROGRAM

## 2022-05-18 PROCEDURE — 84484 ASSAY OF TROPONIN QUANT: CPT

## 2022-05-18 RX ORDER — VALSARTAN 160 MG/1
160 TABLET ORAL NIGHTLY
Status: DISCONTINUED | OUTPATIENT
Start: 2022-05-18 | End: 2022-05-18 | Stop reason: HOSPADM

## 2022-05-18 RX ORDER — ENOXAPARIN SODIUM 100 MG/ML
40 INJECTION SUBCUTANEOUS DAILY
Status: DISCONTINUED | OUTPATIENT
Start: 2022-05-18 | End: 2022-05-18 | Stop reason: HOSPADM

## 2022-05-18 RX ORDER — POTASSIUM CHLORIDE 7.45 MG/ML
10 INJECTION INTRAVENOUS PRN
Status: DISCONTINUED | OUTPATIENT
Start: 2022-05-18 | End: 2022-05-18 | Stop reason: HOSPADM

## 2022-05-18 RX ORDER — SODIUM CHLORIDE 9 MG/ML
INJECTION, SOLUTION INTRAVENOUS CONTINUOUS
Status: DISCONTINUED | OUTPATIENT
Start: 2022-05-18 | End: 2022-05-18 | Stop reason: HOSPADM

## 2022-05-18 RX ORDER — DIVALPROEX SODIUM 500 MG/1
500 TABLET, DELAYED RELEASE ORAL 3 TIMES DAILY
Status: DISCONTINUED | OUTPATIENT
Start: 2022-05-18 | End: 2022-05-18 | Stop reason: HOSPADM

## 2022-05-18 RX ORDER — SODIUM CHLORIDE 0.9 % (FLUSH) 0.9 %
5-40 SYRINGE (ML) INJECTION EVERY 12 HOURS SCHEDULED
Status: DISCONTINUED | OUTPATIENT
Start: 2022-05-18 | End: 2022-05-18 | Stop reason: HOSPADM

## 2022-05-18 RX ORDER — METHADONE HYDROCHLORIDE 5 MG/5ML
135 SOLUTION ORAL DAILY
Status: DISCONTINUED | OUTPATIENT
Start: 2022-05-18 | End: 2022-05-18

## 2022-05-18 RX ORDER — SODIUM CHLORIDE 9 MG/ML
25 INJECTION, SOLUTION INTRAVENOUS PRN
Status: DISCONTINUED | OUTPATIENT
Start: 2022-05-18 | End: 2022-05-18 | Stop reason: HOSPADM

## 2022-05-18 RX ORDER — POLYETHYLENE GLYCOL 3350 17 G/17G
17 POWDER, FOR SOLUTION ORAL DAILY PRN
Status: DISCONTINUED | OUTPATIENT
Start: 2022-05-18 | End: 2022-05-18 | Stop reason: HOSPADM

## 2022-05-18 RX ORDER — ONDANSETRON 2 MG/ML
4 INJECTION INTRAMUSCULAR; INTRAVENOUS EVERY 4 HOURS PRN
Status: DISCONTINUED | OUTPATIENT
Start: 2022-05-18 | End: 2022-05-18 | Stop reason: HOSPADM

## 2022-05-18 RX ORDER — METHADONE HYDROCHLORIDE 5 MG/5ML
105 SOLUTION ORAL DAILY
Status: DISCONTINUED | OUTPATIENT
Start: 2022-05-18 | End: 2022-05-18 | Stop reason: HOSPADM

## 2022-05-18 RX ORDER — LORAZEPAM 1 MG/1
1 TABLET ORAL
Status: COMPLETED | OUTPATIENT
Start: 2022-05-18 | End: 2022-05-18

## 2022-05-18 RX ORDER — ACETAMINOPHEN 325 MG/1
650 TABLET ORAL EVERY 6 HOURS PRN
Status: DISCONTINUED | OUTPATIENT
Start: 2022-05-18 | End: 2022-05-18 | Stop reason: HOSPADM

## 2022-05-18 RX ORDER — POTASSIUM CHLORIDE 20 MEQ/1
40 TABLET, EXTENDED RELEASE ORAL PRN
Status: DISCONTINUED | OUTPATIENT
Start: 2022-05-18 | End: 2022-05-18 | Stop reason: HOSPADM

## 2022-05-18 RX ORDER — AMLODIPINE BESYLATE 2.5 MG/1
2.5 TABLET ORAL DAILY
Status: DISCONTINUED | OUTPATIENT
Start: 2022-05-18 | End: 2022-05-18 | Stop reason: HOSPADM

## 2022-05-18 RX ORDER — SODIUM CHLORIDE 0.9 % (FLUSH) 0.9 %
5-40 SYRINGE (ML) INJECTION PRN
Status: DISCONTINUED | OUTPATIENT
Start: 2022-05-18 | End: 2022-05-18 | Stop reason: HOSPADM

## 2022-05-18 RX ORDER — FUROSEMIDE 20 MG/1
20 TABLET ORAL DAILY
Status: DISCONTINUED | OUTPATIENT
Start: 2022-05-18 | End: 2022-05-18 | Stop reason: HOSPADM

## 2022-05-18 RX ORDER — GABAPENTIN 100 MG/1
100 CAPSULE ORAL 3 TIMES DAILY
Status: DISCONTINUED | OUTPATIENT
Start: 2022-05-18 | End: 2022-05-18 | Stop reason: HOSPADM

## 2022-05-18 RX ORDER — TAMSULOSIN HYDROCHLORIDE 0.4 MG/1
0.4 CAPSULE ORAL DAILY
Status: DISCONTINUED | OUTPATIENT
Start: 2022-05-18 | End: 2022-05-18 | Stop reason: HOSPADM

## 2022-05-18 RX ORDER — ACETAMINOPHEN 650 MG/1
650 SUPPOSITORY RECTAL EVERY 6 HOURS PRN
Status: DISCONTINUED | OUTPATIENT
Start: 2022-05-18 | End: 2022-05-18 | Stop reason: HOSPADM

## 2022-05-18 RX ORDER — MORPHINE SULFATE 4 MG/ML
4 INJECTION, SOLUTION INTRAMUSCULAR; INTRAVENOUS EVERY 4 HOURS PRN
Status: DISCONTINUED | OUTPATIENT
Start: 2022-05-18 | End: 2022-05-18 | Stop reason: HOSPADM

## 2022-05-18 RX ORDER — FLUOXETINE HYDROCHLORIDE 20 MG/1
20 CAPSULE ORAL DAILY
Status: DISCONTINUED | OUTPATIENT
Start: 2022-05-18 | End: 2022-05-18 | Stop reason: HOSPADM

## 2022-05-18 RX ORDER — ATORVASTATIN CALCIUM 40 MG/1
40 TABLET, FILM COATED ORAL DAILY
Status: DISCONTINUED | OUTPATIENT
Start: 2022-05-18 | End: 2022-05-18 | Stop reason: HOSPADM

## 2022-05-18 RX ADMIN — FLUOXETINE HYDROCHLORIDE 20 MG: 20 CAPSULE ORAL at 09:41

## 2022-05-18 RX ADMIN — AMLODIPINE BESYLATE 2.5 MG: 2.5 TABLET ORAL at 09:41

## 2022-05-18 RX ADMIN — MORPHINE SULFATE 4 MG: 4 INJECTION INTRAVENOUS at 03:18

## 2022-05-18 RX ADMIN — MORPHINE SULFATE 4 MG: 4 INJECTION INTRAVENOUS at 06:57

## 2022-05-18 RX ADMIN — GABAPENTIN 100 MG: 100 CAPSULE ORAL at 09:41

## 2022-05-18 RX ADMIN — TAMSULOSIN HYDROCHLORIDE 0.4 MG: 0.4 CAPSULE ORAL at 09:44

## 2022-05-18 RX ADMIN — DIVALPROEX SODIUM 500 MG: 500 TABLET, DELAYED RELEASE ORAL at 09:41

## 2022-05-18 RX ADMIN — ENOXAPARIN SODIUM 40 MG: 100 INJECTION SUBCUTANEOUS at 09:49

## 2022-05-18 RX ADMIN — DESMOPRESSIN ACETATE 40 MG: 0.2 TABLET ORAL at 09:41

## 2022-05-18 RX ADMIN — POTASSIUM CHLORIDE 40 MEQ: 1500 TABLET, EXTENDED RELEASE ORAL at 09:47

## 2022-05-18 RX ADMIN — SODIUM CHLORIDE: 9 INJECTION, SOLUTION INTRAVENOUS at 03:09

## 2022-05-18 RX ADMIN — LORAZEPAM 1 MG: 1 TABLET ORAL at 11:03

## 2022-05-18 RX ADMIN — METHADONE HYDROCHLORIDE 105 MG: 5 SOLUTION ORAL at 09:42

## 2022-05-18 ASSESSMENT — PAIN SCALES - GENERAL
PAINLEVEL_OUTOF10: 6
PAINLEVEL_OUTOF10: 6
PAINLEVEL_OUTOF10: 8

## 2022-05-18 ASSESSMENT — PAIN DESCRIPTION - PAIN TYPE: TYPE: ACUTE PAIN

## 2022-05-18 ASSESSMENT — PAIN DESCRIPTION - LOCATION: LOCATION: CHEST

## 2022-05-18 ASSESSMENT — PAIN DESCRIPTION - FREQUENCY: FREQUENCY: CONTINUOUS

## 2022-05-18 NOTE — PROGRESS NOTES
Pt complaining of sharp stabbing chest pain  EKG ordered and done  States had pain down stairs in ER  States does not want any pain med at this time   No SOB noted   Resting in bed comfortably   Call light in reach

## 2022-05-18 NOTE — PROGRESS NOTES
[address]  CDU / OBSERVATION ENCOUNTER  ATTENDING NOTE       I performed a history and physical examination of the patient and discussed management with the resident or midlevel provider. I reviewed the resident or midlevel provider's note and agree with the documented findings and plan of care. Any areas of disagreement are noted on the chart. I was personally present for the key portions of any procedures. I have documented in the chart those procedures where I was not present during the key portions. I have reviewed the nurses notes. I agree with the chief complaint, past medical history, past surgical history, allergies, medications, social and family history as documented unless otherwise noted below. The Family history, social history, and ROS are effectively unchanged since admission unless noted elsewhere in the chart. Pt admitted to the ETU for chest pain that he says has been constant for the past couple of days. He denies fever, cough, SOB. He says he has had nausea and night sweats. He also c/o chronic neck pain. He denies new fall or injury or new weakness, numbness or tingling to his extremities. On exam, pt is resting comfortably in the bed. Cardiology has been consulted and they do not plan any further workup at this time. Will d/c home.     Radha Ridley MD  Attending Emergency  Physician

## 2022-05-18 NOTE — PROGRESS NOTES
[facility]  Documentation Note                Admission Dx: Chest pain [R07.9]  Chest pain, unspecified type [R07.9]  Chest pain, atypical [R07.89]    Past Medical History:   has a past medical history of Bipolar 1 disorder ([address] and Herniated cervical disc. Previous Testing:     HOLTER 1/27/2022:   1. SR with SB and ST from 39 to 147 bpm.   2. Occasional PACs and PVCs. CATH 4/28/2021: Mild CAD     ECHO 4/12/2021: EF 55%, trivial TR, mild PHTN with RVSP 41 mmHg. STRESS 4/12/2021: No ischemia/infarct. EF 47%. Previous office/hospital visit:   Dr. Arnaud Sol 1/27/2022:   1. Stress test 4/12/21 Negative ischemia/infarct. Normal LVEF  2. Echo 4/12/21- LVEF >55%, thickened mitral valve leaflets, mild PHTN w/ RVSP 41mmHg  3. Readmitted w/ CP  4. Cath 4/28/21- LAD, LCx, RCA all w/ mild irregularities 10-20%. Med tx5. Hypertension6. Hyperlipidemia7. Smoking abuse continue to smoke8.  EKG on 01/27 22 normal sinus rhythm with a QTC of 501 millisecond, patient is on Zoloft can cause it    Plan --   48-year-old pleasant male complaining of chest pain after lifting heavy material locally tender I told him this is not the heart disease local muscle pain QTC prolongation on Zoloft can cause it patient is advised to call the primary physician and ask them to change to medication that will not affect QTC We will do Holter monitor to see any dysrhythmias We will checked electrolytes sometime that can also attribute CAD with history of catheterization with minimal disease doing fine Hypertension fairly controlled on current medications Hyperlipidemia on medications I will see him after the Holter monitor Patient is advise if there is any palpitation lightheaded dizzy go to the ER Talk in detail regarding cutting down on smoking and quitting    [facility]

## 2022-05-18 NOTE — DISCHARGE SUMMARY
CDU Discharge Summary        Patient:  Diane Chowdhury  YOB: 1970    MRN: [id number]   Acct: [de-identified]    Primary Care Physician: RENETTA Delaney CNP    Admit date:  5/17/2022  7:27 PM  Discharge date: 5/18/2022  1:00 PM     Discharge Diagnoses:     1.) Acute Atypical Chest Pain due to unknown etiology and improved with time    Follow-up:  Call today/tomorrow for a follow up appointment with RENETTA Delaney CNP , or return to the Emergency Room with worsening symptoms    Stressed to patient the importance of following up with primary care doctor for further workup/management of symptoms. Pt verbalizes understanding and agrees with plan. Discharge Medication Changes:       Medication List      CONTINUE taking these medications    acetaminophen 500 MG tablet  Commonly known as: TYLENOL  Take 2 tablets by mouth 3 times daily for 10 days     amLODIPine 2.5 MG tablet  Commonly known as: NORVASC     atorvastatin 40 MG tablet  Commonly known as: LIPITOR     divalproex 500 MG DR tablet  Commonly known as: DEPAKOTE     FLUoxetine 20 MG capsule  Commonly known as: PROZAC     furosemide 20 MG tablet  Commonly known as: LASIX     gabapentin 100 MG capsule  Commonly known as: NEURONTIN     methadone 5 MG/5ML solution     Spiriva Respimat 1.25 MCG/ACT Aers inhaler  Generic drug: tiotropium     tamsulosin 0.4 MG capsule  Commonly known as: FLOMAX     Testosterone Cypionate 200 MG/ML Kit     valsartan 160 MG tablet  Commonly known as: DIOVAN        STOP taking these medications    benzonatate 100 MG capsule  Commonly known as: TESSALON     ibuprofen 800 MG tablet  Commonly known as: IBU     LORazepam 1 MG tablet  Commonly known as: ATIVAN     losartan 50 MG tablet  Commonly known as: COZAAR     TENEX PO     Zoloft 100 MG tablet  Generic drug: sertraline            Diet:  ADULT DIET;  Regular, advance as tolerated     Activity:  As tolerated    Consultants: IP CONSULT TO CARDIOLOGY    Procedures: Value Ref Range    Ventricular Rate 55 BPM    Atrial Rate 55 BPM    P-R Interval 138 ms    QRS Duration 94 ms    Q-T Interval 448 ms    QTc Calculation (Bazett) 428 ms    P Axis 73 degrees    R Axis 70 degrees    T Axis 59 degrees     XR CHEST PORTABLE    Result Date: 5/17/2022  EXAMINATION: ONE XRAY VIEW OF THE CHEST 5/17/2022 5:02 pm COMPARISON: 06/06/2021 HISTORY: ORDERING SYSTEM PROVIDED HISTORY: chest pain, 2 days, left sided, sharp, pleuritic TECHNOLOGIST PROVIDED HISTORY: chest pain, 2 days, left sided, sharp, pleuritic FINDINGS: The lungs are without acute focal process. There is no effusion or pneumothorax. The cardiomediastinal silhouette is stable. The osseous structures are stable. No acute process. Physical Exam:    General appearance - NAD, AOx 3  Lungs -CTAB, no R/R/R  Heart - RRR, no M/R/G  Abdomen - Soft, NT/ND  Neurological:  MAEx4, No focal motor deficit, sensory loss  Extremities - Cap refil <2 sec in all ext., no edema  Skin -warm, dry      Hospital Course:  Clinical course has improved, labs and imaging reviewed. Yesi Bsuh originally presented to the hospital on 5/17/2022  7:27 PM with chest pain. At that time it was determined that He required further observation and cardiology consult. Labs and imaging were followed daily. Imaging results as above. Cardiology evaluated patient and determined that he did not require any further evaluation at this time. He is medically stable to be discharged. Disposition: Home    Patient stated that they will not drive themselves home from the hospital if they have gotten pain killers/ narcotics earlier that day and that they will arrange for transportation on their own or work with the  for a ride. Patient counseled NOT to drive while under the influence of narcotics/ pain killers. Condition: Good    Patient stable and ready for discharge home.  I have discussed plan of care with patient and they are in understanding. They were instructed to read discharge paperwork. All of their questions and concerns were addressed. Time Spent: 0 day        Azul Pinedo MD  Emergency Medicine Attending Physician    This dictation was generated by voice recognition computer software. Although all attempts are made to edit the dictation for accuracy, there may be errors in the transcription that are not intended.

## 2022-05-18 NOTE — H&P
[facility]  CDU / OBSERVATION eNCOUnter  Resident Note     Pt Name: Lui Acuña  MRN: [id number]  [place] 1970  Date of evaluation: 5/18/22  Patient's PCP is :  RENETTA Goodman - EL    CHIEF COMPLAINT       Chief Complaint   Patient presents with    Chest Pain     hx kianna, sent by cardio         HISTORY OF PRESENT ILLNESS   Lui Acuña is a 47 y.o. male with past medical history significant for tachycardia, hypertension, CHF with ports of chest pain which has been worsening for the past 2 days. Constant. Left-sided. Better with leaning forward and hugging a pillow. Denies any hemoptysis. Denies any history of previous DVT or PE. Patient does have history of bradycardia, follows with [facility] cardiology. Is an everyday smoker. Does use methadone, has been controlled on methadone for quite some time. Patient was placed on Lasix for the past 1 week. States that this has resolved some of his peripheral edema. Is unsure what makes it better although states that when he is up and moving around or bending over does make the chest pain worse. Patient is not on any anticoagulation or antiplatelet agents. Patient does not have any nausea vomiting dizziness drowsiness chest trauma shortness of breath or other constitutional symptoms.     Location/Symptom: Chest pain  Timing/Onset: constant  Provocation: unclear  Quality: left sided  Radiation: none  Severity: moderate  Timing/Duration: 2 days  Modifying Factors: leaning forward helps    REVIEW OF SYSTEMS       General ROS - No fevers, No malaise   Ophthalmic ROS - No discharge, No changes in vision  ENT ROS -  No sore throat, No rhinorrhea,   Respiratory ROS - wheezing  Cardiovascular ROS -Chest pain  Gastrointestinal ROS - No abdominal pain, no nausea or vomiting, no change in bowel habits, no black or bloody stools  Genito-Urinary ROS - No dysuria, trouble voiding, or hematuria  Musculoskeletal ROS - No myalgias, No arthalgias  Neurological ROS - No headache, no dizziness/lightheadedness, No focal weakness, no loss of sensation  Dermatological ROS - No lesions, No rash     (PQRS) Advance directives on face sheet per hospital policy. No change unless specifically mentioned in chart    [address]    has a past medical history of Bipolar 1 disorder ([facility] [address] and Herniated cervical disc. I have reviewed the past medical history with the patient and it is pertinent to this complaint. SURGICAL HISTORY      has a past surgical history that includes Appendectomy and fracture surgery. I have reviewed and agree with Surgical History entered and it is pertinent to this complaint. CURRENT MEDICATIONS     amLODIPine (NORVASC) tablet 2.5 mg, Daily  atorvastatin (LIPITOR) tablet 40 mg, Daily  divalproex (DEPAKOTE) DR tablet 500 mg, TID  FLUoxetine (PROZAC) capsule 20 mg, Daily  furosemide (LASIX) tablet 20 mg, Daily  gabapentin (NEURONTIN) capsule 100 mg, TID  tamsulosin (FLOMAX) capsule 0.4 mg, Daily  valsartan (DIOVAN) tablet 160 mg, Nightly  0.9 % sodium chloride infusion, Continuous  sodium chloride flush 0.9 % injection 5-40 mL, 2 times per day  sodium chloride flush 0.9 % injection 5-40 mL, PRN  0.9 % sodium chloride infusion, PRN  potassium chloride (KLOR-CON M) extended release tablet 40 mEq, PRN   Or  potassium bicarb-citric acid (EFFER-K) effervescent tablet 40 mEq, PRN   Or  potassium chloride 10 mEq/100 mL IVPB (Peripheral Line), PRN  enoxaparin (LOVENOX) injection 40 mg, Daily  ondansetron (ZOFRAN) injection 4 mg, Q4H PRN  polyethylene glycol (GLYCOLAX) packet 17 g, Daily PRN  acetaminophen (TYLENOL) tablet 650 mg, Q6H PRN   Or  acetaminophen (TYLENOL) suppository 650 mg, Q6H PRN  morphine (PF) injection 4 mg, Q4H PRN  LORazepam (ATIVAN) tablet 1 mg, Once PRN  methadone 5 MG/5ML solution 105 mg, Daily        All medication charted and reviewed.     ALLERGIES     is allergic to codeine, pcn [penicillins], and sulfa antibiotics. FAMILY HISTORY     has no family status information on file. family history is not on file. The patient denies any pertinent family history. I have reviewed and agree with the family history entered. I have reviewed the Family History and it is not significant to the case    SOCIAL HISTORY      reports that he has been smoking cigarettes. He has been smoking about 0.50 packs per day. He has never used smokeless tobacco. He reports current drug use. Drug: Marijuana [place]). He reports that he does not drink alcohol. I have reviewed and agree with all Social.  There are no concerns for substance abuse/use. PHYSICAL EXAM     INITIAL VITALS:  height is 6' (1.829 m) and weight is 170 lb (77.1 kg). His oral temperature is 98 °F (36.7 °C). His blood pressure is 132/68 and his pulse is 57. His respiration is 18 and oxygen saturation is 98%.       CONSTITUTIONAL: AOx4, no apparent distress, appears stated age    HEAD: normocephalic, atraumatic   EYES: PERRLA, EOMI    ENT: moist mucous membranes, uvula midline   NECK: supple, symmetric   BACK: symmetric   LUNGS: Scattered wheezing   CARDIOVASCULAR: regular rate and rhythm, no murmurs, rubs or gallops   ABDOMEN: soft, non-tender, non-distended with normal active bowel sounds   NEUROLOGIC:  MAEx4, no focal sensory or motor deficits   MUSCULOSKELETAL:  Bilateral lower extremity edema   SKIN: no rash or wounds       DIFFERENTIAL DIAGNOSIS/MDM:   ACS, arrhythmia, trauma, PE, PNA, pneumothroax, esophageal rupture, tamponade, drug use, pneumonia, pericarditis, GERD, MSK, Endocarditis, anxiety         DIAGNOSTIC RESULTS         RADIOLOGY:   I directly visualized the following  images and reviewed the radiologist interpretations:    XR CHEST PORTABLE    Result Date: 5/17/2022  EXAMINATION: ONE XRAY VIEW OF THE CHEST 5/17/2022 5:02 pm COMPARISON: 06/06/2021 HISTORY: ORDERING SYSTEM PROVIDED HISTORY: chest pain, 2 days, left sided, sharp, pleuritic TECHNOLOGIST PROVIDED HISTORY: chest pain, 2 days, left sided, sharp, pleuritic FINDINGS: The lungs are without acute focal process. There is no effusion or pneumothorax. The cardiomediastinal silhouette is stable. The osseous structures are stable. No acute process. LABS:  I have reviewed and interpreted all available lab results. Labs Reviewed   CBC WITH AUTO DIFFERENTIAL - Abnormal; Notable for the following components:       Result Value    Seg Neutrophils 70 (*)     Eosinophils % 0 (*)     All other components within normal limits   BASIC METABOLIC PANEL - Abnormal; Notable for the following components:    Glucose 112 (*)     Potassium 3.5 (*)     All other components within normal limits   COVID-19, RAPID   MAGNESIUM   TROPONIN   D-DIMER, QUANTITATIVE   TROPONIN   TROPONIN           CDU IMPRESSION / PLAN      Herb Barnes is a 47 y.o. male who presents with chest pain   -Cardiology evaluation this morning   -Pending stress versus echo   -Continue home antihypertensives        · Continue home medications and pain control  · Monitor vitals, labs, and imaging  · DISPO: pending consults and clinical improvement    CONSULTS:    IP CONSULT TO CARDIOLOGY    PROCEDURES:  Not indicated       PATIENT REFERRED TO:    No follow-up provider specified. --  Nani Ruiz DO   Emergency Medicine Resident     This dictation was generated by voice recognition computer software. Although all attempts are made to edit the dictation for accuracy, there may be errors in the transcription that are not intended.

## 2022-05-18 NOTE — ED NOTES
Pt to ED complaining of low pulse rate and chest pain that hast been going few days ago and has got worst today. His PCP advised him to go to the ER to get check as per the pt. Pt stated he is diagnosed with bradycardia last year upon assessment of Dr. Jen Booker. EKG Done, Placed pt on full cardiac monitor. Vital signs taken and recorded, will continue plan of care. A&O X4. Call light button within reach.      Steven Ya RN  05/17/22 2019

## 2022-05-18 NOTE — CONSULTS
Attestation signed by      Attending Physician Statement:    I have discussed the care of  Dm Denise , including pertinent history and exam findings, with the Cardiology fellow/resident. I have seen and examined the patient and the key elements of all parts of the encounter have been performed by me. I agree with the assessment, plan and orders as documented by the fellow/resident, after I modified exam findings and plan of treatments, and the final version is my approved version of the assessment. Additional Comments:   71-year-old male very well-known to me history of heart cath last year with minimal disease  M with atypical pain constant no relation,, locally tender worse with the neck flexion. Given his extensive cardiac work-up no further cardiac work-up  Patient can use local irritant  Or need x-ray of the neck to make sure there is no pathology  Patient can follow in the office as rao. Jean-Pierre Levi MD     [facility] Cardiology Cardiology    Consult / H&P               Today's Date: 5/18/2022  Patient Name: Dm Denise  Date of admission: 5/17/2022  7:27 PM  Patient's age: 47 y.o., 1970  Admission Dx: Chest pain [R07.9]  Chest pain, unspecified type [R07.9]  Chest pain, atypical [R07.89]    Reason for Consult:  Cardiac evaluation    Requesting Physician: Waqar Merino MD    CHIEF COMPLAINT:  Chest pain    History Obtained From:  patient, electronic medical record    HISTORY OF PRESENT ILLNESS:      The patient is a 47 y.o.  male who is admitted to the hospital for cardiac chest pain. Patient states pain is L sided and radiates into his back. Pain is stabbing, associated with dizziness, nausea, diaphoresis. Pain is 10/10. States this chest pain is unlike the chest pain he has on a daily basis. Chest pain occurs at rest and with acitivity. Patient states he has had significant night sweats, which soak the bed.  States pain recurred this morning when he got up to use the bathroom. Had LE swelling earlier this week, but went down with diuretics. Hx mild CAD s/p cath 4/2021, bradycardia, heart failure with preserved EF 55% (Echo 2021). EKG shows sinus bradycardia with no acute changes. Troponin 10,10,9  D-Dimer 0.25  K 3.5    Past Medical History:   has a past medical history of Bipolar 1 disorder ([address] and Herniated cervical disc. Past Surgical History:   has a past surgical history that includes Appendectomy and fracture surgery. Home Medications:    Prior to Admission medications    Medication Sig Start Date End Date Taking? Authorizing Provider   furosemide (LASIX) 20 MG tablet Take 20 mg by mouth daily 5/9/22  Yes Historical Provider, MD   atorvastatin (LIPITOR) 40 MG tablet Take 40 mg by mouth daily 2/17/22  Yes Historical Provider, MD   FLUoxetine (PROZAC) 20 MG capsule  3/25/22  Yes Historical Provider, MD   valsartan (DIOVAN) 160 MG tablet Take 160 mg by mouth nightly 2/24/22  Yes Historical Provider, MD   Testosterone Cypionate 200 MG/ML KIT Inject into the muscle. Yes Historical Provider, MD   acetaminophen (TYLENOL) 500 MG tablet Take 2 tablets by mouth 3 times daily for 10 days 12/5/21 12/15/21  Hang Zapata MD   divalproex (DEPAKOTE) 500 MG DR tablet Take 500 mg by mouth 3 times daily    Historical Provider, MD   tamsulosin (FLOMAX) 0.4 MG capsule Take 0.4 mg by mouth daily    Historical Provider, MD   amLODIPine (NORVASC) 2.5 MG tablet Take 2.5 mg by mouth daily    Historical Provider, MD   tiotropium (SPIRIVA RESPIMAT) 1.25 MCG/ACT AERS inhaler Inhale 2 puffs into the lungs daily    Historical Provider, MD   gabapentin (NEURONTIN) 100 MG capsule Take 100 mg by mouth 3 times daily. Historical Provider, MD   methadone 5 MG/5ML solution Take 135 mg by mouth daily.  Takes at 0700 am 4/12/21   Historical Provider, MD      Current Facility-Administered Medications: amLODIPine (NORVASC) tablet 2.5 mg, 2.5 mg, Oral, Daily  atorvastatin (LIPITOR) tablet level.     · Eyes: No visual changes or diplopia. No scleral icterus. · ENT: No Headaches  · Cardiovascular: per HPI  · Respiratory: No previous pulmonary problems, No cough  · Gastrointestinal: No abdominal pain. No change in bowel or bladder habits. · Genitourinary: No dysuria, trouble voiding, or hematuria. · Musculoskeletal:  No gait disturbance, No weakness or joint complaints. · Integumentary: No rash or pruritis. · Neurological: No headache, diplopia, change in muscle strength, numbness or tingling. No change in gait, balance, coordination, mood, affect, memory, mentation, behavior. · Psychiatric: No anxiety, or depression. · Endocrine: No temperature intolerance. No excessive thirst, fluid intake, or urination. No tremor. · Hematologic/Lymphatic: No abnormal bruising or bleeding, blood clots or swollen lymph nodes. · Allergic/Immunologic: No nasal congestion or hives. PHYSICAL EXAM:      BP (!) 170/79   Pulse 67   Temp 100 °F (37.8 °C) (Oral)   Resp 19   Ht 6' (1.829 m)   Wt 170 lb (77.1 kg)   SpO2 96%   BMI 23.06 kg/m²    Constitutional and General Appearance: alert, cooperative, no distress and appears stated age  Respiratory:  · Normal excursion and expansion without use of accessory muscles  · Resp Auscultation: Good respiratory effort. No for increased work of breathing. On auscultation: clear to auscultation bilaterally  Cardiovascular:  · The apical impulse is not displaced  · Heart tones are crisp and normal. regular S1 and S2.  · Jugular venous pulsation Normal  · The carotid upstroke is normal in amplitude and contour without delay or bruit  · Peripheral pulses are symmetrical and full   Abdomen:   · No masses or tenderness  Extremities:  ·  No Cyanosis or Clubbing  ·  Lower extremity edema: No  ·  Skin: Warm and dry  Neurological:  · Alert and oriented.   · Moves all extremities well  · No abnormalities of mood, affect, memory, mentation, or behavior are noted    DATA:

## 2022-05-18 NOTE — PLAN OF CARE
Pt relates continuing mild to moderate chest pain, but is agreeable with discharge plan. Printed discharge instructions given and explained to pt. Pt relates understanding and cooperation.

## 2022-05-18 NOTE — CARE COORDINATION
Case Management Initial Discharge Plan  Leyla Rivera,             Met with:patient to discuss discharge plans. Information verified: address, contacts, phone number, , insurance Yes  Insurance Provider: [facility]    Emergency Contact/Next of Kin name & number: Darlyn Littlejohn, [address]  Who are involved in patient's support system? SO     PCP: RENETTA Michaels - CNP  Date of last visit: 2 1/2 weeks ago       Discharge Planning    Living Arrangements:  Spouse/Significant Other     Home has 1 stories  20 stairs to climb to get into front door, na stairs to climb to reach second floor  Location of bedroom/bathroom in home main level     Patient able to perform ADL's:Independent    Current Services (outpatient & in home) none   DME equipment: na   DME provider: na    Is patient receiving oral anticoagulation therapy? No    If indicated:   Physician managing anticoagulation treatment: na   Where does patient obtain lab work for ATC treatment? Na     Does patient have any issues/concerns obtaining medications? No  If yes, what are patient's concerns? Is there a preferred Pharmacy after hours or on weekends? No    If yes, which pharmacy? Potential Assistance Needed:  N/A    Patient agreeable to home care: No  Tropic of choice provided:  n/a    Prior SNF/Rehab Placement and Facility: none   Agreeable to SNF/Rehab: No  Tropic of choice provided: n/a     Evaluation: no    Expected Discharge date:       Patient expects to be discharged to: If home: is the family and/or caregiver wiling & able to provide support at home? Yes   Who will be providing this support?  So *    Follow Up Appointment: Best Day/ Time: Friday PM    Transportation provider: JESSICA   Transportation arrangements needed for discharge: No    Readmission Risk              Risk of Unplanned Readmission:  0             Does patient have a readmission risk score greater than 14?: No  If yes, follow-up appointment must be made within 7 days of discharge.      Goals of Care: Decreased pain       Educated patient  on transitional options, provided freedom of choice and are agreeable with plan      Discharge Plan: home independently           Electronically signed by Mao Donahue RN on 5/18/22 at 11:36 AM EDT

## 2022-05-19 LAB
EKG ATRIAL RATE: 55 BPM
EKG P AXIS: 73 DEGREES
EKG P-R INTERVAL: 138 MS
EKG Q-T INTERVAL: 448 MS
EKG QRS DURATION: 94 MS
EKG QTC CALCULATION (BAZETT): 428 MS
EKG R AXIS: 70 DEGREES
EKG T AXIS: 59 DEGREES
EKG VENTRICULAR RATE: 55 BPM

## 2022-05-19 PROCEDURE — 93010 ELECTROCARDIOGRAM REPORT: CPT | Performed by: INTERNAL MEDICINE

## 2022-05-20 LAB
EKG ATRIAL RATE: 86 BPM
EKG P AXIS: 76 DEGREES
EKG P-R INTERVAL: 132 MS
EKG Q-T INTERVAL: 376 MS
EKG QRS DURATION: 94 MS
EKG QTC CALCULATION (BAZETT): 449 MS
EKG R AXIS: 74 DEGREES
EKG T AXIS: 65 DEGREES
EKG VENTRICULAR RATE: 86 BPM

## 2022-05-20 PROCEDURE — 93010 ELECTROCARDIOGRAM REPORT: CPT | Performed by: INTERNAL MEDICINE

## 2022-08-22 NOTE — ED PROVIDER NOTES
[facility]     Emergency Department     Faculty Attestation    I performed a history and physical examination of the patient and discussed management with the resident. I reviewed the resident´s note and agree with the documented findings and plan of care. Any areas of disagreement are noted on the chart. I was personally present for the key portions of any procedures. I have documented in the chart those procedures where I was not present during the key portions. I have reviewed the emergency nurses triage note. I agree with the chief complaint, past medical history, past surgical history, allergies, medications, social and family history as documented unless otherwise noted below. For Physician Assistant/ Nurse Practitioner cases/documentation I have personally evaluated this patient and have completed at least one if not all key elements of the E/M (history, physical exam, and MDM). Additional findings are as noted. Chest clear,  Heart exam normal , no pain or swelling on examination of the lower extremities , equal pulses both wrists , trachea midline. Abdomen is nontender without pulsatile mass or bruit. Chest pain does not radiate to the back , and the pain is not pleuritic. Patient appears comfortable in no distress, skin is warm and dry. Patient is anxious.     Marlene Capellan MD [facility]  Attending Physician            Mg Elise MD  04/11/21 2619         EKG Interpretation    Interpreted by emergency department physician    Rhythm: normal sinus   Rate: normal/91  Axis: normal 61  Ectopy: none  Conduction: QT corrected 492 ms  ST Segments: no acute change  T Waves: no acute change  Q Waves: none    Clinical Impression: Abnormal EKG    KADY Holliday MD  04/11/21 4205 Prescription Used: 1

## 2024-12-09 ENCOUNTER — HOSPITAL ENCOUNTER (OUTPATIENT)
Age: 54
Setting detail: OBSERVATION
Discharge: HOME | End: 2024-12-10
Attending: EMERGENCY MEDICINE | Admitting: EMERGENCY MEDICINE
Payer: COMMERCIAL

## 2024-12-09 ENCOUNTER — APPOINTMENT (OUTPATIENT)
Dept: GENERAL RADIOLOGY | Age: 54
End: 2024-12-09
Payer: COMMERCIAL

## 2024-12-09 DIAGNOSIS — R07.9 CHEST PAIN, UNSPECIFIED TYPE: Primary | ICD-10-CM

## 2024-12-09 LAB
ALBUMIN SERPL-MCNC: 4.1 G/DL (ref 3.5–5.2)
ALBUMIN/GLOB SERPL: 1.5 {RATIO} (ref 1–2.5)
ALP SERPL-CCNC: 63 U/L (ref 40–129)
ALT SERPL-CCNC: <5 U/L (ref 10–50)
ANION GAP SERPL CALCULATED.3IONS-SCNC: 11 MMOL/L (ref 9–16)
AST SERPL-CCNC: 22 U/L (ref 10–50)
BASOPHILS # BLD: <0.03 K/UL (ref 0–0.2)
BASOPHILS NFR BLD: 0 % (ref 0–2)
BILIRUB SERPL-MCNC: 0.3 MG/DL (ref 0–1.2)
BUN SERPL-MCNC: 9 MG/DL (ref 6–20)
CALCIUM SERPL-MCNC: 9.4 MG/DL (ref 8.6–10.4)
CHLORIDE SERPL-SCNC: 105 MMOL/L (ref 98–107)
CO2 SERPL-SCNC: 24 MMOL/L (ref 20–31)
CREAT SERPL-MCNC: 0.7 MG/DL (ref 0.7–1.2)
EOSINOPHIL # BLD: 0.05 K/UL (ref 0–0.44)
EOSINOPHILS RELATIVE PERCENT: 1 % (ref 1–4)
ERYTHROCYTE [DISTWIDTH] IN BLOOD BY AUTOMATED COUNT: 12.7 % (ref 11.8–14.4)
GFR, ESTIMATED: >90 ML/MIN/1.73M2
GLUCOSE SERPL-MCNC: 104 MG/DL (ref 74–99)
HCT VFR BLD AUTO: 39.1 % (ref 40.7–50.3)
HGB BLD-MCNC: 13.3 G/DL (ref 13–17)
IMM GRANULOCYTES # BLD AUTO: <0.03 K/UL (ref 0–0.3)
IMM GRANULOCYTES NFR BLD: 0 %
LYMPHOCYTES NFR BLD: 1.91 K/UL (ref 1.1–3.7)
LYMPHOCYTES RELATIVE PERCENT: 22 % (ref 24–43)
MCH RBC QN AUTO: 30.4 PG (ref 25.2–33.5)
MCHC RBC AUTO-ENTMCNC: 34 G/DL (ref 28.4–34.8)
MCV RBC AUTO: 89.5 FL (ref 82.6–102.9)
MONOCYTES NFR BLD: 0.76 K/UL (ref 0.1–1.2)
MONOCYTES NFR BLD: 9 % (ref 3–12)
NEUTROPHILS NFR BLD: 68 % (ref 36–65)
NEUTS SEG NFR BLD: 5.81 K/UL (ref 1.5–8.1)
NRBC BLD-RTO: 0 PER 100 WBC
PLATELET # BLD AUTO: 310 K/UL (ref 138–453)
PMV BLD AUTO: 10.4 FL (ref 8.1–13.5)
POTASSIUM SERPL-SCNC: 3.7 MMOL/L (ref 3.7–5.3)
PROT SERPL-MCNC: 6.9 G/DL (ref 6.6–8.7)
RBC # BLD AUTO: 4.37 M/UL (ref 4.21–5.77)
SODIUM SERPL-SCNC: 140 MMOL/L (ref 136–145)
TROPONIN I SERPL HS-MCNC: 10 NG/L (ref 0–22)
TROPONIN I SERPL HS-MCNC: 12 NG/L (ref 0–22)
WBC OTHER # BLD: 8.6 K/UL (ref 3.5–11.3)

## 2024-12-09 PROCEDURE — 96375 TX/PRO/DX INJ NEW DRUG ADDON: CPT

## 2024-12-09 PROCEDURE — 99285 EMERGENCY DEPT VISIT HI MDM: CPT

## 2024-12-09 PROCEDURE — 6360000002 HC RX W HCPCS: Performed by: STUDENT IN AN ORGANIZED HEALTH CARE EDUCATION/TRAINING PROGRAM

## 2024-12-09 PROCEDURE — 85025 COMPLETE CBC W/AUTO DIFF WBC: CPT

## 2024-12-09 PROCEDURE — G0378 HOSPITAL OBSERVATION PER HR: HCPCS

## 2024-12-09 PROCEDURE — 84484 ASSAY OF TROPONIN QUANT: CPT

## 2024-12-09 PROCEDURE — 96376 TX/PRO/DX INJ SAME DRUG ADON: CPT

## 2024-12-09 PROCEDURE — 6360000002 HC RX W HCPCS: Performed by: EMERGENCY MEDICINE

## 2024-12-09 PROCEDURE — 80053 COMPREHEN METABOLIC PANEL: CPT

## 2024-12-09 PROCEDURE — 6370000000 HC RX 637 (ALT 250 FOR IP): Performed by: STUDENT IN AN ORGANIZED HEALTH CARE EDUCATION/TRAINING PROGRAM

## 2024-12-09 PROCEDURE — 71046 X-RAY EXAM CHEST 2 VIEWS: CPT

## 2024-12-09 PROCEDURE — 96374 THER/PROPH/DIAG INJ IV PUSH: CPT

## 2024-12-09 PROCEDURE — 93005 ELECTROCARDIOGRAM TRACING: CPT | Performed by: STUDENT IN AN ORGANIZED HEALTH CARE EDUCATION/TRAINING PROGRAM

## 2024-12-09 RX ORDER — DIVALPROEX SODIUM 500 MG/1
500 TABLET, DELAYED RELEASE ORAL 3 TIMES DAILY
Status: DISCONTINUED | OUTPATIENT
Start: 2024-12-09 | End: 2024-12-10 | Stop reason: HOSPADM

## 2024-12-09 RX ORDER — MORPHINE SULFATE 4 MG/ML
4 INJECTION, SOLUTION INTRAMUSCULAR; INTRAVENOUS ONCE
Status: COMPLETED | OUTPATIENT
Start: 2024-12-09 | End: 2024-12-09

## 2024-12-09 RX ORDER — LORAZEPAM 2 MG/ML
1 INJECTION INTRAMUSCULAR EVERY 4 HOURS PRN
Status: DISCONTINUED | OUTPATIENT
Start: 2024-12-09 | End: 2024-12-10 | Stop reason: HOSPADM

## 2024-12-09 RX ORDER — MORPHINE SULFATE 4 MG/ML
4 INJECTION INTRAVENOUS ONCE
Status: COMPLETED | OUTPATIENT
Start: 2024-12-09 | End: 2024-12-09

## 2024-12-09 RX ORDER — METHADONE HYDROCHLORIDE 10 MG/1
110 TABLET ORAL DAILY
Status: DISCONTINUED | OUTPATIENT
Start: 2024-12-09 | End: 2024-12-10 | Stop reason: HOSPADM

## 2024-12-09 RX ORDER — METHADONE HYDROCHLORIDE 10 MG/1
110 TABLET ORAL DAILY
Status: DISCONTINUED | OUTPATIENT
Start: 2024-12-09 | End: 2024-12-09

## 2024-12-09 RX ORDER — VALSARTAN 160 MG/1
160 TABLET ORAL NIGHTLY
Status: DISCONTINUED | OUTPATIENT
Start: 2024-12-09 | End: 2024-12-10 | Stop reason: HOSPADM

## 2024-12-09 RX ORDER — ATORVASTATIN CALCIUM 40 MG/1
40 TABLET, FILM COATED ORAL DAILY
Status: DISCONTINUED | OUTPATIENT
Start: 2024-12-10 | End: 2024-12-10 | Stop reason: HOSPADM

## 2024-12-09 RX ORDER — AMLODIPINE BESYLATE 2.5 MG/1
2.5 TABLET ORAL DAILY
Status: DISCONTINUED | OUTPATIENT
Start: 2024-12-10 | End: 2024-12-10 | Stop reason: HOSPADM

## 2024-12-09 RX ORDER — KETOROLAC TROMETHAMINE 30 MG/ML
30 INJECTION, SOLUTION INTRAMUSCULAR; INTRAVENOUS EVERY 6 HOURS PRN
Status: DISCONTINUED | OUTPATIENT
Start: 2024-12-09 | End: 2024-12-09

## 2024-12-09 RX ORDER — ACETAMINOPHEN 500 MG
1000 TABLET ORAL
Status: COMPLETED | OUTPATIENT
Start: 2024-12-09 | End: 2024-12-09

## 2024-12-09 RX ADMIN — MORPHINE SULFATE 4 MG: 4 INJECTION INTRAVENOUS at 09:11

## 2024-12-09 RX ADMIN — LORAZEPAM 1 MG: 2 INJECTION INTRAMUSCULAR; INTRAVENOUS at 17:08

## 2024-12-09 RX ADMIN — ACETAMINOPHEN 1000 MG: 500 TABLET ORAL at 11:42

## 2024-12-09 RX ADMIN — DIVALPROEX SODIUM 500 MG: 500 TABLET, DELAYED RELEASE ORAL at 20:29

## 2024-12-09 RX ADMIN — LORAZEPAM 1 MG: 2 INJECTION INTRAMUSCULAR; INTRAVENOUS at 21:22

## 2024-12-09 RX ADMIN — VALSARTAN 160 MG: 160 TABLET, FILM COATED ORAL at 20:29

## 2024-12-09 RX ADMIN — DIVALPROEX SODIUM 500 MG: 500 TABLET, DELAYED RELEASE ORAL at 14:26

## 2024-12-09 RX ADMIN — MORPHINE SULFATE 4 MG: 4 INJECTION INTRAVENOUS at 15:32

## 2024-12-09 ASSESSMENT — PAIN SCALES - GENERAL
PAINLEVEL_OUTOF10: 7
PAINLEVEL_OUTOF10: 7
PAINLEVEL_OUTOF10: 8

## 2024-12-09 ASSESSMENT — PAIN DESCRIPTION - DESCRIPTORS: DESCRIPTORS: SHARP;STABBING

## 2024-12-09 ASSESSMENT — PAIN DESCRIPTION - FREQUENCY: FREQUENCY: CONTINUOUS

## 2024-12-09 ASSESSMENT — PAIN DESCRIPTION - ORIENTATION: ORIENTATION: LEFT

## 2024-12-09 ASSESSMENT — PAIN DESCRIPTION - PAIN TYPE: TYPE: ACUTE PAIN

## 2024-12-09 ASSESSMENT — HEART SCORE: ECG: NON-SPECIFC REPOLARIZATION DISTURBANCE/LBTB/PM

## 2024-12-09 ASSESSMENT — PAIN - FUNCTIONAL ASSESSMENT: PAIN_FUNCTIONAL_ASSESSMENT: 0-10

## 2024-12-09 ASSESSMENT — PAIN DESCRIPTION - LOCATION
LOCATION: CHEST
LOCATION: CHEST

## 2024-12-09 NOTE — H&P
[facility]  CDU / OBSERVATION ENCOUNTER  Physician NOTE     Pt Name: Jose Teran  MRN: [id number]  Birthdate 1970  Date of evaluation: 12/9/24  Patient's PCP is :  Lorna Jones APRN - CNP    CHIEF COMPLAINT       Chief Complaint   Patient presents with    Chest Pain         HISTORY OF PRESENT ILLNESS    Jose Teran is a 54 y.o. male who presents with complaints of chest pain.  Patient was arrested Friday and has not had his methadone since.  Patient believes he is withdrawing.  Reports he started having chest pain approximate hour prior to arrival.  Patient was given nitroglycerin and aspirin with mild improvement.  He had central sharp pain.  Patient reports he does have a cardiac history and follows with Dr. Rausch with a history of bradycardia in the past.  Patient admitted for treatment of withdrawal and cardiology evaluation.    Location/Symptom: Chest pain  Timing/Onset: Over several hours  Provocation: Likely due to withdrawal  Quality: Chest discomfort  Radiation: None  Severity: Moderate  Timing/Duration: Hours prior to presentation  Modifying Factors: Likely withdrawal    History was obtained in part through review of the ED chart. When possible, a direct discussion was had with ED nurses, residents, and attendings  REVIEW OF SYSTEMS        General ROS - No fevers, No malaise, anxiety  Ophthalmic ROS - No discharge, No changes in vision  ENT ROS -  No sore throat, No rhinorrhea,   Respiratory ROS - no shortness of breath, no cough, no  wheezing  Cardiovascular ROS - chest pain, no dyspnea on exertion  Gastrointestinal ROS - No abdominal pain, no nausea or vomiting, no change in bowel habits, no black or bloody stools  Genito-Urinary ROS - No dysuria, trouble voiding, or hematuria  Musculoskeletal ROS - No myalgias, No arthalgias  Neurological ROS - No headache, no dizziness/lightheadedness, No focal weakness, no loss of sensation  Dermatological ROS - No lesions, No rash  05/17/2022. HISTORY: ORDERING SYSTEM PROVIDED HISTORY: Chest Pain TECHNOLOGIST PROVIDED HISTORY: Chest Pain FINDINGS: The cardiac silhouette and mediastinal contours are normal.  The lungs are clear.  No pleural effusion or pneumothorax.  Degenerative changes are noted in the spine.  Postsurgical changes are noted in the lower cervical spine, incompletely imaged.     No acute cardiopulmonary process.       LABS:  I have reviewed and interpreted all available lab results.  Labs Reviewed   CBC WITH AUTO DIFFERENTIAL - Abnormal; Notable for the following components:       Result Value    Hematocrit 39.1 (*)     Neutrophils % 68 (*)     Lymphocytes % 22 (*)     All other components within normal limits   COMPREHENSIVE METABOLIC PANEL - Abnormal; Notable for the following components:    Glucose 104 (*)     ALT <5 (*)     All other components within normal limits   TROPONIN   TROPONIN         CDU IMPRESSION / PLAN      Jose Teran is a 54 y.o. male who presents with       Chest pain, probable withdrawal    Patient has been out of his methadone for several days  Symptoms becoming intolerable  Patient has prior cardiac history  No EKG changes or cardiac enzyme elevation  Analgesics given  Ativan for withdrawal  Continue home medications and pain control  Monitor vitals, labs, and imaging  DISPO: pending consults and clinical improvement    CONSULTS:    IP CONSULT TO CARDIOLOGY    PROCEDURES:  Not indicated         PATIENT REFERRED TO:    No follow-up provider specified.    --  Gio López MD   Observation Physician    This dictation was generated by voice recognition computer software.  Although all attempts are made to edit the dictation for accuracy, there may be errors in the transcription that are not intended.

## 2024-12-09 NOTE — ED NOTES
Pt to ed via ems to room with complaints of left sided chest pain that started about an hour pta  Ems provided 324mg aspirin, 1 nitro  Pt states pain now 7/10 sharp stabbing cont  Pt states he hasn't had methadone since Friday  Pt states \"I think I was given my meds today\"  Pt states he smoke cigarettes  Pt denies being SOB  Pt denies the pain to be radiating  Pt placed on cont cardiac monitor, ekg completed, iv established, labs drawn, labeled and will send to lab via orders  Pt alert and oriented x4, talking in complete sentences, respirations even and unlabored. Pt acting age appropriate. White board updated, will continue to plan of care

## 2024-12-09 NOTE — ED PROVIDER NOTES
[facility] EMERGENCY DEPARTMENT     Emergency Department     Faculty Attestation    I performed a history and physical examination of the patient and discussed management with the resident. I reviewed the resident’s note and agree with the documented findings and plan of care. Any areas of disagreement are noted on the chart. I was personally present for the key portions of any procedures. I have documented in the chart those procedures where I was not present during the key portions. I have reviewed the emergency nurses triage note. I agree with the chief complaint, past medical history, past surgical history, allergies, medications, social and family history as documented unless otherwise noted below. For Physician Assistant/ Nurse Practitioner cases/documentation I have personally evaluated this patient and have completed at least one if not all key elements of the E/M (history, physical exam, and MDM). Additional findings are as noted.    10:13 AM EST    Patient presents with chest pain that he says started earlier this morning.  He says he was not doing anything in particular at the time.  Patient is currently in [facility] after being arrested on Friday.  He denies any injuries to the chest.  He says he is on methadone but has not received any doses of it since Friday.  Patient denies any recent fever, cough, congestion, nausea, vomiting, diarrhea.  Patient does have a history of bradycardia.  On exam, patient is resting comfortably in the bed.  Lungs are clear to auscultation bilaterally and heart sounds are normal.  Abdomen is soft and nontender.  The bilateral calves are nontender nonswollen.  Will get EKG, chest x-ray, labs and reassess.    EKG Interpretation    Interpreted by emergency department physician    Rhythm: normal sinus   Rate: normal  Axis: normal  Ectopy: none  Conduction: normal  ST Segments: normal  T Waves: normal  Q Waves: none    Clinical Impression: non-specific EKG    Monica Carrion,  MD Monica Carrion MD  Attending Emergency  Physician

## 2024-12-09 NOTE — ED PROVIDER NOTES
[facility] EMERGENCY DEPARTMENT  Emergency Department Encounter  Emergency Medicine Resident     Pt Name:Jose Teran  MRN: [id number]  Birthdate 1970  Date of evaluation: 12/9/24  PCP:  Lorna Jones APRN - CNP  Note Started: 9:29 AM EST      CHIEF COMPLAINT       Chief Complaint   Patient presents with    Chest Pain       HISTORY OF PRESENT ILLNESS  (Location/Symptom, Timing/Onset, Context/Setting, Quality, Duration, Modifying Factors, Severity.)      Jose Teran is a 54 y.o. male who presents with chest pain.  Patient is a prisoner, recently incarcerated.  He reports that he has not had his methadone since Friday and believes that he is withdrawing.  He reports that he started having chest pain at approximately 1 hour prior to arrival.  He reports being given nitroglycerin and aspirin with very mild improvement in his pain.  He reports that central and sharp in nature he denies any shortness of breath, fever but does report some chills.  Denies nausea or vomiting.  Patient reports that he does have some cardiac history that he follows with Dr. Rausch for and has a history of bradycardia in the past.  He denies previous cardiac stents and reports that he is getting some of his medications at the halfway but is unsure which ones.  Patient does report significant anxiety as well.    PAST MEDICAL / SURGICAL / SOCIAL / FAMILY HISTORY      has a past medical history of Bipolar 1 disorder (HCC) and Herniated cervical disc.     has a past surgical history that includes Appendectomy and fracture surgery.    Social History     Socioeconomic History    Marital status:      Spouse name: Not on file    Number of children: Not on file    Years of education: Not on file    Highest education level: Not on file   Occupational History    Not on file   Tobacco Use    Smoking status: Every Day     Current packs/day: 0.50     Types: Cigarettes    Smokeless tobacco: Never   Substance and Sexual Activity

## 2024-12-09 NOTE — CARE COORDINATION
Case Management Assessment  Initial Evaluation    Date/Time of Evaluation: 12/9/2024 2:37 PM  Assessment Completed by: ADAM MARRERO RN    If patient is discharged prior to next notation, then this note serves as note for discharge by case management.    Patient Name: Jose Teran                   YOB: 1970  Diagnosis: Chest pain [R07.9]  Chest pain, unspecified type [R07.9]                   Date / Time: 12/9/2024  9:01 AM    Patient Admission Status: Observation   Readmission Risk (Low < 19, Mod (19-27), High > 27): No data recorded  Current PCP: Lorna Jones APRN - CNP  PCP verified by CM? (P) Yes    Chart Reviewed: Yes      History Provided by: (P) Patient  Patient Orientation: (P) Alert and Oriented    Patient Cognition: (P) Alert    Hospitalization in the last 30 days (Readmission):  No    If yes, Readmission Assessment in  Navigator will be completed.    Advance Directives:      Code Status: Prior   Patient's Primary Decision Maker is:        Discharge Planning:    Patient lives with: (P) Alone Type of Home: (P) Other (Comment) (in correctional facility)  Primary Care Giver: (P) Self  Patient Support Systems include: (P) Friends/Neighbors   Current Financial resources: (P) Medicaid  Current community resources: (P) None  Current services prior to admission: (P) None            Current DME:              Type of Home Care services:  (P) None    ADLS  Prior functional level: (P) Independent in ADLs/IADLs  Current functional level: (P) Independent in ADLs/IADLs    PT AM-PAC:   /24  OT AM-PAC:   /24    Family can provide assistance at DC: (P) No  Would you like Case Management to discuss the discharge plan with any other family members/significant others, and if so, who? (P) No  Plans to Return to Present Housing: (P) Yes  Other Identified Issues/Barriers to RETURNING to current housing: none  Potential Assistance needed at discharge: (P) N/A            Potential DME:    Patient expects  to discharge to: (P) Other (comment) (in correctional facility)  Plan for transportation at discharge:      Financial    Payor: [employer] PLAN / Plan: [employer] PLAN / Product Type: *No Product type* /     Does insurance require precert for SNF: Yes    Potential assistance Purchasing Medications: (P) No  Meds-to-Beds request: Yes      [place], [place] - [address] [phone] - F [phone]  [address] [address]  Phone: [phone] Fax: [phone]      Notes:    Factors facilitating achievement of predicted outcomes: Friend support, Motivated, Cooperative, and Pleasant    Barriers to discharge: Medical complications    Additional Case Management Notes: Return independently to apartment with released from correctional facility. Denies CM needs.     The Plan for Transition of Care is related to the following treatment goals of Chest pain [R07.9]  Chest pain, unspecified type [R07.9]    IF APPLICABLE: The Patient and/or patient representative Jose and his family were provided with a choice of provider and agrees with the discharge plan. Freedom of choice list with basic dialogue that supports the patient's individualized plan of care/goals and shares the quality data associated with the providers was provided to:     Patient Representative Name:       The Patient and/or Patient Representative Agree with the Discharge Plan?      ADAM MARRERO RN  Case Management Department  Ph: [phone] Fax: [phone]

## 2024-12-09 NOTE — ED NOTES
ED to inpatient nurses report      Chief Complaint:  Chief Complaint   Patient presents with    Chest Pain     Present to ED from: correctional facility     MOA:     LOC: alert and orientated to name, place, date  Mobility: Independent  Oxygen Baseline: room air    Current needs required: 2L sleeping after morphine   Pending ED orders:   Present condition: stable resting on cot    Why did the patient come to the ED? Pt to ed via ems to room with complaints of left sided chest pain that started about an hour pta  Ems provided 324mg aspirin, 1 nitro  Pt states pain now 7/10 sharp stabbing cont  Pt states he hasn't had methadone since Friday  Pt states \"I think I was given my meds today\"  Pt states he smoke cigarettes  Pt denies being SOB  Pt denies the pain to be radiating  Pt placed on cont cardiac monitor, ekg completed, iv established, labs drawn, labeled and will send to lab via orders  Pt alert and oriented x4, talking in complete sentences, respirations even and unlabored. Pt acting age appropriate. White board updated, will continue to plan of care   What is the plan? Admit to obs for cars  Any procedures or intervention occur? Labs mds  Any safety concerns??    Mental Status:       Psych Assessment:   Psychosocial  Psychosocial (WDL): Within Defined Limits  Vital signs   Vitals:    12/09/24 0930 12/09/24 1000 12/09/24 1030 12/09/24 1100   BP: 137/83      Pulse: (!) 48 (!) 45 (!) 44 56   Resp: 18 15 14 15   Temp:       TempSrc:       SpO2: 98% 99% 99% 100%   Weight:       Height:            Vitals:  Patient Vitals for the past 24 hrs:   BP Temp Temp src Pulse Resp SpO2 Height Weight   12/09/24 1100 -- -- -- 56 15 100 % -- --   12/09/24 1030 -- -- -- (!) 44 14 99 % -- --   12/09/24 1000 -- -- -- (!) 45 15 99 % -- --   12/09/24 0930 137/83 -- -- (!) 48 18 98 % -- --   12/09/24 0916 (!) 142/93 -- -- 65 23 99 % -- --   12/09/24 0906 124/88 98.1 °F (36.7 °C) Oral 75 15 98 % -- --   12/09/24 0902 -- -- -- -- -- --

## 2024-12-10 VITALS
OXYGEN SATURATION: 100 % | WEIGHT: 131.17 LBS | SYSTOLIC BLOOD PRESSURE: 118 MMHG | HEART RATE: 69 BPM | TEMPERATURE: 98.2 F | RESPIRATION RATE: 18 BRPM | BODY MASS INDEX: 17.77 KG/M2 | HEIGHT: 72 IN | DIASTOLIC BLOOD PRESSURE: 65 MMHG

## 2024-12-10 LAB
EKG ATRIAL RATE: 46 BPM
EKG ATRIAL RATE: 73 BPM
EKG P AXIS: 85 DEGREES
EKG P AXIS: 91 DEGREES
EKG P-R INTERVAL: 116 MS
EKG P-R INTERVAL: 126 MS
EKG Q-T INTERVAL: 436 MS
EKG Q-T INTERVAL: 474 MS
EKG QRS DURATION: 88 MS
EKG QRS DURATION: 90 MS
EKG QTC CALCULATION (BAZETT): 414 MS
EKG QTC CALCULATION (BAZETT): 480 MS
EKG R AXIS: 73 DEGREES
EKG R AXIS: 88 DEGREES
EKG T AXIS: 69 DEGREES
EKG T AXIS: 79 DEGREES
EKG VENTRICULAR RATE: 46 BPM
EKG VENTRICULAR RATE: 73 BPM

## 2024-12-10 PROCEDURE — 96376 TX/PRO/DX INJ SAME DRUG ADON: CPT

## 2024-12-10 PROCEDURE — G0378 HOSPITAL OBSERVATION PER HR: HCPCS

## 2024-12-10 PROCEDURE — 6370000000 HC RX 637 (ALT 250 FOR IP): Performed by: STUDENT IN AN ORGANIZED HEALTH CARE EDUCATION/TRAINING PROGRAM

## 2024-12-10 PROCEDURE — 93005 ELECTROCARDIOGRAM TRACING: CPT | Performed by: EMERGENCY MEDICINE

## 2024-12-10 PROCEDURE — 6360000002 HC RX W HCPCS: Performed by: EMERGENCY MEDICINE

## 2024-12-10 PROCEDURE — 99223 1ST HOSP IP/OBS HIGH 75: CPT | Performed by: INTERNAL MEDICINE

## 2024-12-10 PROCEDURE — 93010 ELECTROCARDIOGRAM REPORT: CPT | Performed by: INTERNAL MEDICINE

## 2024-12-10 RX ADMIN — AMLODIPINE BESYLATE 2.5 MG: 2.5 TABLET ORAL at 08:17

## 2024-12-10 RX ADMIN — LORAZEPAM 1 MG: 2 INJECTION INTRAMUSCULAR; INTRAVENOUS at 04:28

## 2024-12-10 RX ADMIN — TIOTROPIUM BROMIDE INHALATION SPRAY 1 PUFF: 3.12 SPRAY, METERED RESPIRATORY (INHALATION) at 09:13

## 2024-12-10 RX ADMIN — LORAZEPAM 1 MG: 2 INJECTION INTRAMUSCULAR; INTRAVENOUS at 08:17

## 2024-12-10 RX ADMIN — ATORVASTATIN CALCIUM 40 MG: 40 TABLET, FILM COATED ORAL at 08:17

## 2024-12-10 RX ADMIN — DIVALPROEX SODIUM 500 MG: 500 TABLET, DELAYED RELEASE ORAL at 08:17

## 2024-12-10 NOTE — CONSULTS
[facility]    Consult / H&P               Today's Date: 12/10/2024  Patient Name: Jose Teran  Date of admission: 12/9/2024  9:01 AM  Patient's age: 54 y.o., 1970  Admission Dx: Chest pain [R07.9]  Chest pain, unspecified type [R07.9]    Reason for Consult:  Cardiac evaluation    Requesting Physician: Gio López MD    CHIEF COMPLAINT:  Chest pain    History Obtained From:  patient    HISTORY OF PRESENT ILLNESS:      The patient is a 54 y.o.  male who is admitted to the hospital for Chest Pain  Jose Teran complains of chest pain. Onset was 1 day ago. Symptoms have been unchanged since that time. The patient's pain is constant. The patient describes the pain as sharp and radiates to the left arm. Patient rates pain as a 7/10 in intensity. Associated symptoms are: chest pain and dyspnea. Aggravating factors are:  not receiving methadone . Alleviating factors are:  receiving methadone . Patient's cardiac risk factors are: dyslipidemia, hypertension, male gender, and smoking/ tobacco exposure. Patient's risk factors for DVT/PE: none. Previous cardiac testing: coronary angiography, date 4/28/21, location [facility] .    Past Medical History:   has a past medical history of Bipolar 1 disorder (HCC) and Herniated cervical disc.    Past Surgical History:   has a past surgical history that includes Appendectomy and fracture surgery.     Home Medications:    Prior to Admission medications    Medication Sig Start Date End Date Taking? Authorizing Provider   methadone 5 MG/5ML solution Take 157 mLs by mouth daily. Takes at 0700 am   Yes Hina Jimenez MD   amLODIPine (NORVASC) 2.5 MG tablet TAKE 1 TABLET BY MOUTH DAILY 8/12/24  Yes Casimiro Holloway MD   atorvastatin (LIPITOR) 40 MG tablet Take 1 tablet by mouth daily 2/17/22  Yes Hina Jimenez MD   FLUoxetine (PROZAC) 20 MG capsule  3/25/22  Yes Hina Jimenez MD   valsartan (DIOVAN) 160 MG tablet Take 1 tablet  previous pulmonary problems, No cough  Gastrointestinal: No abdominal pain.  No change in bowel or bladder habits.  Genitourinary: No dysuria, trouble voiding, or hematuria.  Musculoskeletal:  No gait disturbance, No weakness or joint complaints.  Integumentary: No rash or pruritis.  Neurological: No headache, diplopia, change in muscle strength, numbness or tingling. No change in gait, balance, coordination, mood, affect, memory, mentation, behavior.  Psychiatric: No anxiety, or depression.  Endocrine: No temperature intolerance. No excessive thirst, fluid intake, or urination. No tremor.  Hematologic/Lymphatic: No abnormal bruising or bleeding, blood clots or swollen lymph nodes.  Allergic/Immunologic: No nasal congestion or hives.      PHYSICAL EXAM:      /65   Pulse 69   Temp 98.2 °F (36.8 °C) (Temporal)   Resp 18   Ht 1.829 m (6') Comment: stated  Wt 59.5 kg (131 lb 2.8 oz)   SpO2 100%   BMI 17.79 kg/m²    Constitutional and General Appearance: alert, cooperative, no distress and appears stated age  HEENT: PERRL, no cervical lymphadenopathy. No masses palpable. Normal oral mucosa  Respiratory:  Normal excursion and expansion without use of accessory muscles  Resp Auscultation: Good respiratory effort. No for increased work of breathing. On auscultation: clear to auscultation bilaterally  Cardiovascular:  The apical impulse is not displaced  Heart tones are crisp and normal. regular S1 and S2.  Jugular venous pulsation Normal  The carotid upstroke is normal in amplitude and contour without delay or bruit  Peripheral pulses are symmetrical and full   Abdomen:   No masses or tenderness  Bowel sounds present  Extremities:   No Cyanosis or Clubbing   Lower extremity edema: No   Skin: Warm and dry  Neurological:  Alert and oriented.  Moves all extremities well  No abnormalities of mood, affect, memory, mentation, or behavior are noted    DATA:    Diagnostics:    EKG: sinus bradycardia, When compared with

## 2024-12-10 NOTE — DISCHARGE SUMMARY
CDU Discharge Summary        Patient:  Jose Teran  YOB: 1970    MRN: [id number]   Acct: [id number]    Primary Care Physician: Lorna Jones APRN - CNP    Admit date:  12/9/2024  9:01 AM  Discharge date: 12/10/2024  1:20 PM    Discharge Diagnoses:     1.)  Patient had chest pain with acute onset.  Treated with conservative management.  Patient's symptoms are improved with the plan to follow up outpatient.    Follow-up:  Call today/tomorrow for a follow up appointment with Lorna Jones APRN - CNP , or return to the Emergency Room with worsening symptoms    Stressed to patient the importance of following up with primary care doctor for further workup/management of symptoms.  Pt verbalizes understanding and agrees with plan.    Discharge Medication Changes:       Medication List        CONTINUE taking these medications      acetaminophen 500 MG tablet  Commonly known as: TYLENOL  Take 2 tablets by mouth 3 times daily for 10 days     amLODIPine 2.5 MG tablet  Commonly known as: NORVASC  TAKE 1 TABLET BY MOUTH DAILY     atorvastatin 40 MG tablet  Commonly known as: LIPITOR     divalproex 500 MG DR tablet  Commonly known as: DEPAKOTE     FLUoxetine 20 MG capsule  Commonly known as: PROZAC     furosemide 20 MG tablet  Commonly known as: LASIX     gabapentin 100 MG capsule  Commonly known as: NEURONTIN     Spiriva Respimat 1.25 MCG/ACT Aers inhaler  Generic drug: tiotropium     tamsulosin 0.4 MG capsule  Commonly known as: FLOMAX     Testosterone Cypionate 200 MG/ML Kit     valsartan 160 MG tablet  Commonly known as: DIOVAN            STOP taking these medications      methadone 5 MG/5ML solution              Diet:  No diet orders on file, advance as tolerated     Activity:  As tolerated    Consultants: IP CONSULT TO CARDIOLOGY    Procedures:  Not indicated     Diagnostic Test:   Results for orders placed or performed during the hospital encounter of 12/09/24   CBC with Auto Differential   Result  questions and concerns were addressed.     Time Spent: 0 day      --  Viktor Ramirez, DO  Observation Physician    This dictation was generated by voice recognition computer software.  Although all attempts are made to edit the dictation for accuracy, there may be errors in the transcription that are not intended.

## 2024-12-10 NOTE — DISCHARGE INSTRUCTIONS
Your workup from the emergency department did not show any significant pathology but you were admitted to the observation unit for ongoing evaluation.    You saw the following specialists: Cardiology   Cardiology does not believe that your chest pain requires any additional workup.    We no longer need to keep you in the hospital but that does not mean you are done being treated:  -Take your prescribed medications as directed  -If you begin to experience withdrawal symptoms, medical at the group home will be able to treat you with fluids, electrolytes, Suboxone, and comfort meds.  This is no different than what we would do to treat your withdrawal symptoms if you are admitted to the hospital.  You are medically stable to be discharged back for incarceration.  -Follow-up with your primary care physician or the specialist designated or both as scheduled    Please return if your condition worsens or there are new symptoms    If there are concerns that have not been addressed while you have been in the hospital please let the discharge nurse know so the physician can be informed -it is easier to fix problems while you are still here    If you have questions after you have left the hospital please call the unit at  and ask for the observation resident on-call

## 2024-12-10 NOTE — PLAN OF CARE
Problem: Discharge Planning  Goal: Discharge to home or other facility with appropriate resources  12/10/2024 1207 by Martinez Peguero RN  Outcome: Adequate for Discharge  12/9/2024 2232 by Ga Kaminski, RN  Outcome: Progressing     Problem: Pain  Goal: Verbalizes/displays adequate comfort level or baseline comfort level  12/10/2024 1207 by Martinez Peguero RN  Outcome: Adequate for Discharge  12/9/2024 2232 by Ga Kaminski, RN  Outcome: Progressing     Problem: Cardiovascular - Adult  Goal: Maintains optimal cardiac output and hemodynamic stability  12/10/2024 1207 by Martinez Peguero RN  Outcome: Adequate for Discharge  12/9/2024 2232 by Ga Kaminski, RN  Outcome: Progressing  Goal: Absence of cardiac dysrhythmias or at baseline  12/10/2024 1207 by Martinez Peguero RN  Outcome: Adequate for Discharge  12/9/2024 2232 by Ga Kaminski, RN  Outcome: Progressing

## 2024-12-10 NOTE — PROGRESS NOTES
OBS/CDU   Progress NOTE      Patients PCP is:  Lorna Jones, [facility]        SUBJECTIVE      Patient was seen and evaluated this morning at bedside.  Patient denies any acute events overnight.  Patient states that his chest pain is still present.  He describes as central stabbing chest pain that radiates to his left arm.  Patient also complains of a runny nose, which can be a sign of active withdrawal.  Patient was already seen by cardiology this morning and they do not believe the patient requires any additional workup or management.  Cardiology signed off and states patient is okay for discharge.  Aside from his chest pain, patient denies any abdominal pain, nausea, vomiting, focal neurological deficits, or any other concerning symptoms.    PHYSICAL EXAM      General: NAD, AO X 3  Heent: EOMI, PERRL  Neck: SUPPLE, NO JVD  Cardiovascular: RRR, S1S2  Pulmonary: CTAB, NO SOB  Abdomen: SOFT, NTTP, ND, +BS  Extremities: +2/4 PULSES DISTAL, NO SWELLING  Neuro / Psych: NO NUMBNESS OR TINGLING, MENTATION AT BASELINE    PERTINENT TEST /EXAMS      I have reviewed all available laboratory results.    MEDICATIONS CURRENT   amLODIPine (NORVASC) tablet 2.5 mg, Daily  atorvastatin (LIPITOR) tablet 40 mg, Daily  divalproex (DEPAKOTE) DR tablet 500 mg, TID  tiotropium (SPIRIVA RESPIMAT) 2.5 MCG/ACT inhaler 1 puff, Daily RT  valsartan (DIOVAN) tablet 160 mg, Nightly  methadone (DOLOPHINE) tablet 110 mg, Daily  LORazepam (ATIVAN) injection 1 mg, Q4H PRN        All medication charted and reviewed.    CONSULTS      IP CONSULT TO CARDIOLOGY    ASSESSMENT/PLAN       Jose Teran is a 54 y.o. male who presents with chest pain and probable withdrawal     Patient will be given a diet order and if he can tolerate oral intake, will be discharged today.      Patient has been out of his methadone for several days  Patient has prior cardiac history  No EKG changes or cardiac enzyme elevation  Cardiology consulted and states no further

## 2024-12-11 NOTE — PROGRESS NOTES
[facility]  CDU / OBSERVATION ENCOUNTER  ATTENDING NOTE         I performed a history and physical examination of the patient and discussed management with the resident or midlevel provider. I reviewed the resident or midlevel provider's note and agree with the documented findings and plan of care. Any areas of disagreement are noted on the chart. I was personally present for the key portions of any procedures. I have documented in the chart those procedures where I was not present during the key portions. I have reviewed the nurses notes. I agree with the chief complaint, past medical history, past surgical history, allergies, medications, social and family history as documented unless otherwise noted below.    The Family history, social history, and ROS are effectively unchanged since admission unless noted elsewhere in the chart.     This patient was placed in the observation unit for reevaluation for possible admission to the hospital     Patient admitted for cardiology evaluation.  Patient was without his methadone for several days.  Patient showing signs of withdrawal.  Appreciate cardiology input.       Gio López MD  Attending Emergency  Physician

## 2025-04-07 ENCOUNTER — OFFICE VISIT (OUTPATIENT)
Age: 55
End: 2025-04-07

## 2025-04-07 VITALS
SYSTOLIC BLOOD PRESSURE: 132 MMHG | TEMPERATURE: 98.6 F | BODY MASS INDEX: 20.86 KG/M2 | HEIGHT: 72 IN | WEIGHT: 154 LBS | DIASTOLIC BLOOD PRESSURE: 71 MMHG | OXYGEN SATURATION: 98 % | RESPIRATION RATE: 16 BRPM | HEART RATE: 68 BPM

## 2025-04-07 DIAGNOSIS — R51.9 FACIAL PAIN: Primary | ICD-10-CM

## 2025-04-07 RX ORDER — CLONIDINE HYDROCHLORIDE 0.1 MG/1
TABLET, EXTENDED RELEASE ORAL
COMMUNITY
Start: 2025-03-24

## 2025-04-07 RX ORDER — CLINDAMYCIN HYDROCHLORIDE 300 MG/1
300 CAPSULE ORAL 3 TIMES DAILY
Qty: 21 CAPSULE | Refills: 0 | Status: SHIPPED | OUTPATIENT
Start: 2025-04-07 | End: 2025-04-14

## 2025-04-07 RX ORDER — CYPROHEPTADINE HYDROCHLORIDE 4 MG/1
TABLET ORAL
COMMUNITY
Start: 2025-03-24

## 2025-04-07 RX ORDER — DIAZEPAM 5 MG/1
TABLET ORAL
COMMUNITY
Start: 2025-03-28

## 2025-04-07 RX ORDER — CLINDAMYCIN HYDROCHLORIDE 150 MG/1
CAPSULE ORAL
COMMUNITY
Start: 2025-03-13

## 2025-04-07 RX ORDER — IBUPROFEN 800 MG/1
800 TABLET, FILM COATED ORAL 2 TIMES DAILY PRN
Qty: 60 TABLET | Refills: 0 | Status: SHIPPED | OUTPATIENT
Start: 2025-04-07

## 2025-04-07 ASSESSMENT — ENCOUNTER SYMPTOMS
EYE DISCHARGE: 0
COUGH: 0
ABDOMINAL DISTENTION: 0
BACK PAIN: 0
COLOR CHANGE: 0
APNEA: 0
SORE THROAT: 0
EYE ITCHING: 0
ABDOMINAL PAIN: 0
FACIAL SWELLING: 1
SINUS PRESSURE: 0

## 2025-04-07 NOTE — PROGRESS NOTES
[facility] [place] [address]  Phone: [phone]  Fax: [phone]      Jose Teran  1970  MRN: [id number]  Date of visit: 2025    Chief Complaint:     Jose Teran (:  1970) is a 54 y.o. male,New patient, here for evaluation of the following chief complaint(s):  Jaw Pain (X3d went to dentist of Friday, woke up with abscess today/)      Allergies   Allergen Reactions    Cyclobenzaprine Other (See Comments)     Patient states he gets restless feeling    Sulfacetamide     Sulfacetamide Sodium Anaphylaxis     All sulfa drugs    Penicillins Hives     Previously tolerated keflex    Codeine Hives    Sulfa Antibiotics         Current Outpatient Medications   Medication Sig Dispense Refill    clindamycin (CLEOCIN) 150 MG capsule       cloNIDine (KAPVAY) 0.1 MG TB12 extended release tablet       cyproheptadine (PERIACTIN) 4 MG tablet       diazePAM (VALIUM) 5 MG tablet       clindamycin (CLEOCIN) 300 MG capsule Take 1 capsule by mouth 3 times daily for 7 days 21 capsule 0    ibuprofen (ADVIL;MOTRIN) 800 MG tablet Take 1 tablet by mouth 2 times daily as needed for Pain 60 tablet 0    amLODIPine (NORVASC) 2.5 MG tablet TAKE 1 TABLET BY MOUTH DAILY 30 tablet 1    furosemide (LASIX) 20 MG tablet Take 1 tablet by mouth daily      atorvastatin (LIPITOR) 40 MG tablet Take 1 tablet by mouth daily      FLUoxetine (PROZAC) 20 MG capsule       valsartan (DIOVAN) 160 MG tablet Take 1 tablet by mouth nightly      Testosterone Cypionate 200 MG/ML KIT Inject into the muscle.      acetaminophen (TYLENOL) 500 MG tablet Take 2 tablets by mouth 3 times daily for 10 days 60 tablet 0    divalproex (DEPAKOTE) 500 MG DR tablet Take 1 tablet by mouth 3 times daily      tamsulosin (FLOMAX) 0.4 MG capsule Take 1 capsule by mouth daily      tiotropium (SPIRIVA RESPIMAT) 1.25 MCG/ACT AERS inhaler Inhale 2 puffs into the lungs daily      gabapentin (NEURONTIN) 100 MG capsule